# Patient Record
Sex: MALE | Race: WHITE | Employment: OTHER | URBAN - METROPOLITAN AREA
[De-identification: names, ages, dates, MRNs, and addresses within clinical notes are randomized per-mention and may not be internally consistent; named-entity substitution may affect disease eponyms.]

---

## 2020-05-11 ENCOUNTER — TRANSFERRED RECORDS (OUTPATIENT)
Dept: HEALTH INFORMATION MANAGEMENT | Facility: CLINIC | Age: 60
End: 2020-05-11

## 2020-05-17 ENCOUNTER — HOSPITAL ENCOUNTER (INPATIENT)
Facility: CLINIC | Age: 60
LOS: 5 days | Discharge: HOME | End: 2020-05-22
Attending: EMERGENCY MEDICINE | Admitting: PSYCHIATRY & NEUROLOGY
Payer: COMMERCIAL

## 2020-05-17 ENCOUNTER — TELEPHONE (OUTPATIENT)
Dept: BEHAVIORAL HEALTH | Facility: CLINIC | Age: 60
End: 2020-05-17

## 2020-05-17 DIAGNOSIS — R45.851 SUICIDAL IDEATION: ICD-10-CM

## 2020-05-17 DIAGNOSIS — F41.9 ANXIETY: ICD-10-CM

## 2020-05-17 DIAGNOSIS — G40.909 SEIZURE DISORDER (H): ICD-10-CM

## 2020-05-17 DIAGNOSIS — Z03.818 ENCNTR FOR OBS FOR SUSP EXPSR TO OTH BIOLG AGENTS RULED OUT: ICD-10-CM

## 2020-05-17 DIAGNOSIS — F41.1 GENERALIZED ANXIETY DISORDER: Primary | ICD-10-CM

## 2020-05-17 DIAGNOSIS — G47.09 OTHER INSOMNIA: ICD-10-CM

## 2020-05-17 DIAGNOSIS — E11.9 TYPE 2 DIABETES MELLITUS WITHOUT COMPLICATION, WITHOUT LONG-TERM CURRENT USE OF INSULIN (H): ICD-10-CM

## 2020-05-17 DIAGNOSIS — F33.9 EPISODE OF RECURRENT MAJOR DEPRESSIVE DISORDER, UNSPECIFIED DEPRESSION EPISODE SEVERITY (H): ICD-10-CM

## 2020-05-17 DIAGNOSIS — F32.2 SEVERE MAJOR DEPRESSION WITHOUT PSYCHOTIC FEATURES (H): ICD-10-CM

## 2020-05-17 PROBLEM — F33.2 SEVERE EPISODE OF RECURRENT MAJOR DEPRESSIVE DISORDER, WITHOUT PSYCHOTIC FEATURES (H): Status: ACTIVE | Noted: 2020-05-17

## 2020-05-17 LAB
AMPHETAMINES UR QL SCN: NEGATIVE
BARBITURATES UR QL: NEGATIVE
BENZODIAZ UR QL: NEGATIVE
CANNABINOIDS UR QL SCN: NEGATIVE
COCAINE UR QL: NEGATIVE
ETHANOL UR QL SCN: NEGATIVE
GLUCOSE BLDC GLUCOMTR-MCNC: 84 MG/DL (ref 70–99)
OPIATES UR QL SCN: NEGATIVE
SARS-COV-2 PCR COMMENT: NORMAL
SARS-COV-2 RNA SPEC QL NAA+PROBE: NEGATIVE
SARS-COV-2 RNA SPEC QL NAA+PROBE: NORMAL
SPECIMEN SOURCE: NORMAL
SPECIMEN SOURCE: NORMAL

## 2020-05-17 PROCEDURE — 99222 1ST HOSP IP/OBS MODERATE 55: CPT | Mod: 95 | Performed by: NURSE PRACTITIONER

## 2020-05-17 PROCEDURE — 99207 ZZC CDG-MDM COMPONENT: MEETS MODERATE - DOWN CODED: CPT | Performed by: NURSE PRACTITIONER

## 2020-05-17 PROCEDURE — 25000132 ZZH RX MED GY IP 250 OP 250 PS 637: Performed by: NURSE PRACTITIONER

## 2020-05-17 PROCEDURE — 99285 EMERGENCY DEPT VISIT HI MDM: CPT | Mod: 25 | Performed by: EMERGENCY MEDICINE

## 2020-05-17 PROCEDURE — 90791 PSYCH DIAGNOSTIC EVALUATION: CPT

## 2020-05-17 PROCEDURE — 80320 DRUG SCREEN QUANTALCOHOLS: CPT | Performed by: EMERGENCY MEDICINE

## 2020-05-17 PROCEDURE — 12400001 ZZH R&B MH UMMC

## 2020-05-17 PROCEDURE — 87635 SARS-COV-2 COVID-19 AMP PRB: CPT | Performed by: EMERGENCY MEDICINE

## 2020-05-17 PROCEDURE — 80307 DRUG TEST PRSMV CHEM ANLYZR: CPT | Performed by: EMERGENCY MEDICINE

## 2020-05-17 PROCEDURE — 99285 EMERGENCY DEPT VISIT HI MDM: CPT | Mod: Z6 | Performed by: EMERGENCY MEDICINE

## 2020-05-17 PROCEDURE — 00000146 ZZHCL STATISTIC GLUCOSE BY METER IP

## 2020-05-17 RX ORDER — BUPROPION HYDROCHLORIDE 300 MG/1
300 TABLET ORAL EVERY MORNING
Status: ON HOLD | COMMUNITY
End: 2020-05-21

## 2020-05-17 RX ORDER — GLIPIZIDE 10 MG/1
10 TABLET, FILM COATED, EXTENDED RELEASE ORAL
Status: DISCONTINUED | OUTPATIENT
Start: 2020-05-18 | End: 2020-05-22 | Stop reason: HOSPADM

## 2020-05-17 RX ORDER — BUPROPION HYDROCHLORIDE 300 MG/1
300 TABLET ORAL DAILY
Status: DISCONTINUED | OUTPATIENT
Start: 2020-05-18 | End: 2020-05-22 | Stop reason: HOSPADM

## 2020-05-17 RX ORDER — METFORMIN HCL 500 MG
TABLET, EXTENDED RELEASE 24 HR ORAL
Status: ON HOLD | COMMUNITY
End: 2020-05-21

## 2020-05-17 RX ORDER — ACETAMINOPHEN 325 MG/1
650 TABLET ORAL EVERY 4 HOURS PRN
Status: DISCONTINUED | OUTPATIENT
Start: 2020-05-17 | End: 2020-05-22 | Stop reason: HOSPADM

## 2020-05-17 RX ORDER — DEXTROSE MONOHYDRATE 25 G/50ML
25-50 INJECTION, SOLUTION INTRAVENOUS
Status: DISCONTINUED | OUTPATIENT
Start: 2020-05-17 | End: 2020-05-22 | Stop reason: HOSPADM

## 2020-05-17 RX ORDER — ATORVASTATIN CALCIUM 20 MG/1
20 TABLET, FILM COATED ORAL DAILY
COMMUNITY

## 2020-05-17 RX ORDER — TRAZODONE HYDROCHLORIDE 50 MG/1
50 TABLET, FILM COATED ORAL
Status: DISCONTINUED | OUTPATIENT
Start: 2020-05-17 | End: 2020-05-22 | Stop reason: HOSPADM

## 2020-05-17 RX ORDER — GLIPIZIDE 10 MG/1
10 TABLET, FILM COATED, EXTENDED RELEASE ORAL DAILY
COMMUNITY

## 2020-05-17 RX ORDER — CITALOPRAM HYDROBROMIDE 20 MG/1
20 TABLET ORAL DAILY
Status: DISCONTINUED | OUTPATIENT
Start: 2020-05-17 | End: 2020-05-22 | Stop reason: HOSPADM

## 2020-05-17 RX ORDER — NICOTINE POLACRILEX 4 MG
15-30 LOZENGE BUCCAL
Status: DISCONTINUED | OUTPATIENT
Start: 2020-05-17 | End: 2020-05-22 | Stop reason: HOSPADM

## 2020-05-17 RX ORDER — HYDROXYZINE HYDROCHLORIDE 25 MG/1
25 TABLET, FILM COATED ORAL EVERY 4 HOURS PRN
Status: DISCONTINUED | OUTPATIENT
Start: 2020-05-17 | End: 2020-05-20

## 2020-05-17 RX ORDER — METFORMIN HCL 500 MG
500 TABLET, EXTENDED RELEASE 24 HR ORAL
Status: DISCONTINUED | OUTPATIENT
Start: 2020-05-17 | End: 2020-05-22 | Stop reason: HOSPADM

## 2020-05-17 RX ORDER — ATORVASTATIN CALCIUM 20 MG/1
20 TABLET, FILM COATED ORAL EVERY EVENING
Status: DISCONTINUED | OUTPATIENT
Start: 2020-05-17 | End: 2020-05-22 | Stop reason: HOSPADM

## 2020-05-17 RX ORDER — ALUMINA, MAGNESIA, AND SIMETHICONE 2400; 2400; 240 MG/30ML; MG/30ML; MG/30ML
30 SUSPENSION ORAL EVERY 4 HOURS PRN
Status: DISCONTINUED | OUTPATIENT
Start: 2020-05-17 | End: 2020-05-22 | Stop reason: HOSPADM

## 2020-05-17 RX ADMIN — METFORMIN HYDROCHLORIDE 500 MG: 500 TABLET, EXTENDED RELEASE ORAL at 19:05

## 2020-05-17 RX ADMIN — ATORVASTATIN CALCIUM 20 MG: 20 TABLET, FILM COATED ORAL at 19:05

## 2020-05-17 ASSESSMENT — ACTIVITIES OF DAILY LIVING (ADL)
DRESS: 0-->INDEPENDENT
TRANSFERRING: 0-->INDEPENDENT
DRESS: INDEPENDENT
BATHING: 0-->INDEPENDENT
HYGIENE/GROOMING: INDEPENDENT
LAUNDRY: WITH SUPERVISION
NUMBER_OF_TIMES_PATIENT_HAS_FALLEN_WITHIN_LAST_SIX_MONTHS: 0
AMBULATION: 0-->INDEPENDENT
ORAL_HYGIENE: INDEPENDENT
RETIRED_COMMUNICATION: 0-->UNDERSTANDS/COMMUNICATES WITHOUT DIFFICULTY
HYGIENE/GROOMING: INDEPENDENT
RETIRED_EATING: 0-->INDEPENDENT
FALL_HISTORY_WITHIN_LAST_SIX_MONTHS: NO
DRESS: INDEPENDENT
SWALLOWING: 0-->SWALLOWS FOODS/LIQUIDS WITHOUT DIFFICULTY
ORAL_HYGIENE: INDEPENDENT
COGNITION: 0 - NO COGNITION ISSUES REPORTED
TOILETING: 0-->INDEPENDENT
LAUNDRY: WITH SUPERVISION

## 2020-05-17 ASSESSMENT — MIFFLIN-ST. JEOR: SCORE: 1615.48
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
## 2020-05-17 NOTE — PLAN OF CARE
"Patient arrived  5/17/2020  9:29 AM to the ED and transferred to unit 32N 5/17/2020 at 1250. Patient expression is Blunted/Flat. Patient appears anxious, flat and is cooperative. EPIC listed admitting  DX: Suicidal ideation [R45.851]  Anxiety [F41.9]  Episode of recurrent major depressive disorder, unspecified depression episode severity (H) [F33.9]    Patient describes \"poor sleep (trouble falling asleep and trouble staying asleep, poor appetite, hopeless, tired, anxious about COVID 19, poor concentration and depressed.\" Patient lives with wife and son and plans to return home. Patient GOAL is medication management and coping/therapy strategies to improve quality of life stating \"have always been depressed.\" Patient \"very tired\" at time of interview having been \"awake all night.\"    Vital signs reviewed related to admission.  /79   Pulse 83   Temp 98  F (36.7  C) (Oral)   Resp 16   Ht 1.778 m (5' 10\")   Wt 80.4 kg (177 lb 4.8 oz)   SpO2 96%   BMI 25.44 kg/m  . Patient denies  pain.     Patient rates depression 8/10* c/o Significant change in appetite or weight  Change in sleep (too much or too little)  Fatigue or loss of energy  Feelings of worthlessness or excessive/inappropriate guilt  Diminished concentration or indecisiveness  Patient rates anxiety at 8/10* c/o Uncontrolled worrying  Trouble relaxing  Restlessness           Patient denies current or recent suicidal ideation  Plan:   SIB denies     HI: Denies  Patient stated REASON for Admission : \"depression and anxiety (anxiety worse with COVID 19).\"                Patient stated GOALS for Discharge: \"Feel better/more energy.\"     PRECAUTIONS at admit:SUICIDE      15 min checks initiated. Brief orientation to unit provided.     Nursing will continue to monitor.    *Scale is 1-10 (10 is the worst)                "

## 2020-05-17 NOTE — ED PROVIDER NOTES
History     Chief Complaint   Patient presents with     Depression     Increasing depression and anxiety.     HPI  Angel Kent is a 60 year old male with a past medical history of depression, HLD, T2DM, and seizures who presents to the emergency department for mental health evaluation. Her reports increasing depression and anxiety.    The patient saw his PCP 2 days ago for this problem.  He reports that he has struggled with the symptoms for many years, but has had a significant increase in symptoms recently.   He has been admitted to the [facility] in the past for mental health and feels that this did help him. Patient has had suicidal ideation and feelings of worthlessness/hopelessness, but without specific plan.  Denies any homicidal ideation.  He discussed with his PCP 2 days ago that he felt he may need to be admitted again, but at that time, the decision was made for him to go home and discuss potential treatment options with his family with a plan to contact his PCP on Monday to discuss the plan going forward.  The patient denies any alcohol or drug use.  He recently restarted taking Wellbutrin 1.5-2 weeks ago. He used to take wellbutrin and celexa and felt that these did help some. He stopped taking his medications around January of this year because he was sick and tired of taking so many medications for his depression as well as for his diabetes. Since that time, he feels that his symptoms have worsened. He also attributes a worsening of his symptoms to increasing stress due to the Covid-19 pandemic situation.     The patient lives at home with his wife and son, who is about to graduate from high school. The pt feels guilty about not being able to be more supportive for his son and his inability to work due to his psychiatric symptoms. The pt usually works as a .     I have reviewed the Medications, Allergies, Past Medical and Surgical History, and Social History in the Epic  system.    Past Medical History:   Diagnosis Date     Depressive disorder      Seizures (H) 2010     Past Surgical History:   Procedure Laterality Date     ORTHOPEDIC SURGERY      knee     No current facility-administered medications for this encounter.      Current Outpatient Medications   Medication     CITALOPRAM HYDROBROMIDE PO     LamoTRIgine (LAMICTAL PO)     No Known Allergies  Past medical history, past surgical history, medications, and allergies were reviewed with the patient. Additional pertinent items: None    Social History     Socioeconomic History     Marital status:      Spouse name: Not on file     Number of children: Not on file     Years of education: Not on file     Highest education level: Not on file   Occupational History     Not on file   Social Needs     Financial resource strain: Not on file     Food insecurity     Worry: Not on file     Inability: Not on file     Transportation needs     Medical: Not on file     Non-medical: Not on file   Tobacco Use     Smoking status: Never Smoker   Substance and Sexual Activity     Alcohol use: No     Drug use: No     Sexual activity: Not on file   Lifestyle     Physical activity     Days per week: Not on file     Minutes per session: Not on file     Stress: Not on file   Relationships     Social connections     Talks on phone: Not on file     Gets together: Not on file     Attends Mormonism service: Not on file     Active member of club or organization: Not on file     Attends meetings of clubs or organizations: Not on file     Relationship status: Not on file     Intimate partner violence     Fear of current or ex partner: Not on file     Emotionally abused: Not on file     Physically abused: Not on file     Forced sexual activity: Not on file   Other Topics Concern     Not on file   Social History Narrative     Not on file     Social history was reviewed with the patient. Additional pertinent items: None    Review of Systems  General: No  fevers or chills  Skin: No rash or diaphoresis  Eyes: No eye redness or discharge  Ears/Nose/Throat: No rhinorrhea or nasal congestion  Respiratory: No cough or SOB  Cardiovascular: No chest pain or palpitations  Gastrointestinal: No nausea, vomiting, or diarrhea  Genitourinary: No urinary frequency, hematuria, or dysuria  Musculoskeletal: No arthralgias or myalgias  Neurologic: No numbness or weakness  Psychiatric: See HPI  Hematologic/Lymphatic/Immunologic: No leg swelling, no easy bruising/bleeding  Endocrine: No polyuria/polydypsia    A complete review of systems was performed with pertinent positives and negatives noted in the HPI, and all other systems negative.    Physical Exam   BP: (!) 142/99  Pulse: 72  Heart Rate: 72  Temp: 97.3  F (36.3  C)  Resp: 16  SpO2: 97 %      General: Well nourished, well developed, NAD  HEENT: EOMI, anicteric. NCAT, MMM  Neck: no jugular venous distension, supple, nl ROM  Cardiac: Regular rate, extremities appear well perfused  Pulm: NLB, airway patent  Skin: Warm and dry to the touch.  No rash  Extremities: No LE edema, no cyanosis, w/w/p  Neuro: A&Ox3, no gross focal deficits  Psych: depressed mood, passive SI without plan, anxiety    ED Course        Procedures                           Labs Ordered and Resulted from Time of ED Arrival Up to the Time of Departure from the ED   DRUG ABUSE SCREEN 6 CHEM DEP URINE ([facility])   COVID-19 VIRUS (CORONAVIRUS) BY PCR            No results found for this or any previous visit (from the past 24 hour(s)).    Labs, vital signs, and imaging studies were reviewed by me.    Medications - No data to display    Assessments & Plan (with Medical Decision Making)   Angel Kent is a 60 year old male who presents with increasing depression and anxiety.  He does report suicidal ideation, but does not currently have a plan.    1034 Pt was d/w [facility] , they recommend admission to mental health unit for further management of pt's psychiatric  symptoms.     I have reviewed the nursing notes.    I have reviewed the findings, diagnosis, plan and need for follow up with the patient.    Patient to be admitted to mental health unit. Plan was discussed with patient who understands and agrees with plan.     New Prescriptions    No medications on file       Final diagnoses:   Episode of recurrent major depressive disorder, unspecified depression episode severity (H)   Anxiety   Suicidal ideation       5/17/2020   [facility], [place], EMERGENCY DEPARTMENT     Petra Yun MD  05/17/20 1112
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
## 2020-05-17 NOTE — ED NOTES
ED to Behavioral Floor Handoff    SITUATION  Angel Kent is a 60 year old male who speaks English and lives in a home with family members The patient arrived in the ED by private car from home with a complaint of Depression (Increasing depression and anxiety.)  .The patient's current symptoms started/worsened 2 week(s) ago and during this time the symptoms have increased.   In the ED, pt was diagnosed with   Final diagnoses:   Episode of recurrent major depressive disorder, unspecified depression episode severity (H)   Anxiety   Suicidal ideation        Initial vitals were: BP: (!) 142/99  Pulse: 72  Heart Rate: 72  Temp: 97.3  F (36.3  C)  Resp: 16  SpO2: 97 %   --------  Is the patient diabetic? No   If yes, last blood glucose? --     If yes, was this treated in the ED? --  --------  Is the patient inebriated (ETOH) No or Impaired on other substances? No  MSSA done? N/A  Last MSSA score: --    Were withdrawal symptoms treated? N/A  Does the patient have a seizure history? Yes. If yes, date of most recent seizure - Pt states he has had one seizure in his life, 8 years ago. No precipitating factors and no seizure since then.  --------  Is the patient patient experiencing suicidal ideation? reports occasional suicidal thoughts representing feeling that life is not worth feeling    Homicidal ideation? denies current or recent homicidal ideation or behaviors.    Self-injurious behavior/urges? denies current or recent self injurious behavior or ideation.  ------  Was pt aggressive in the ED No  Was a code called No  Is the pt now cooperative? Yes  -------  Meds given in ED: Medications - No data to display   Family present during ED course? No  Family currently present? No    BACKGROUND  Does the patient have a cognitive impairment or developmental disability? No  Allergies: No Known Allergies.   Social demographics are   Social History     Socioeconomic History     Marital status:      Spouse name: None      Number of children: None     Years of education: None     Highest education level: None   Occupational History     None   Social Needs     Financial resource strain: None     Food insecurity     Worry: None     Inability: None     Transportation needs     Medical: None     Non-medical: None   Tobacco Use     Smoking status: Never Smoker   Substance and Sexual Activity     Alcohol use: No     Drug use: No     Sexual activity: None   Lifestyle     Physical activity     Days per week: None     Minutes per session: None     Stress: None   Relationships     Social connections     Talks on phone: None     Gets together: None     Attends Cheondoism service: None     Active member of club or organization: None     Attends meetings of clubs or organizations: None     Relationship status: None     Intimate partner violence     Fear of current or ex partner: None     Emotionally abused: None     Physically abused: None     Forced sexual activity: None   Other Topics Concern     None   Social History Narrative     None        ASSESSMENT  Labs results   Labs Ordered and Resulted from Time of ED Arrival Up to the Time of Departure from the ED   DRUG ABUSE SCREEN 6 CHEM DEP URINE ([facility])   COVID-19 VIRUS (CORONAVIRUS) BY PCR      Imaging Studies: No results found for this or any previous visit (from the past 24 hour(s)).   Most recent vital signs BP (!) 142/99   Pulse 72   Temp 97.3  F (36.3  C) (Oral)   Resp 16   SpO2 97%    Abnormal labs/tests/findings requiring intervention:---   Pain control: pt had none  Nausea control: pt had none    RECOMMENDATION  Are any infection precautions needed (MRSA, VRE, etc.)? No If yes, what infection? --  ---  Does the patient have mobility issues? independently. If yes, what device does the pt use? ---  ---  Is patient on 72 hour hold or commitment? No If on 72 hour hold, have hold and rights been given to patient? N/A  Are admitting orders written if after 10 p.m. ?N/A  Tasks needing to  be completed:---     Mara Cole RN     [phone] [facility]

## 2020-05-17 NOTE — TELEPHONE ENCOUNTER
S: Pt is a 60 yrs old male in the [place] ED for increased symptoms of mental health, reports by Brooklynn.    B: Pt has a long standing history of Depression and Anxiety. Pt presents to the ED for worsening Depression and Anxiety.  No drug or alcohol.  Pt reports that he stopped his medications back in January and since then has decompensated.  Pt has not been eating nor sleeping.  He feels ashame about going out in public, not being good enough, hopeless, guilt.  Pt has a weight loss of about 25-30 lbs; has poor eye contact.  Pt has passive SI, thinks people would do better w/o him, made statement that he doesn't want to go on.  Unable to work, feeling overwhelmed.  It has been 11 yrs since he had an episode like this. Family reports that Pt walks and talks slowly-which is not like him.   Pt has a hx of seizures.  His last one was in 2010.  Pt's last  in admission was in 2019 and was followed by a Partial Program. Pt has a dx of Diabetes.  Pt's labs were unremarkable.  He is medically cleared and ambulates independently.        Pt was swabbed for COVID19 test.  Results pending.     A: Vol.      R:  11:05AM- Olga accepted for herself on 32.  Unit and ED notified.           Patient cleared and ready for behavioral bed placement: Yes

## 2020-05-17 NOTE — PROGRESS NOTES
05/17/20 1416   Valuables   Patient Belongings remains with patient;locker;sent to security per site process  (glasses, shirt, pants, socks and cloth mask)   Patient Belongings Remaining with Patient other (see comments)  (clothing and cloth mask)   Patient Belongings Put in Hospital Secure Location (Security or Locker, etc.) other (see comments)  (see attached note for itemization)   Did you bring any home meds/supplements to the hospital?  No     Items sent to security: envelope [id number] containing 2 credit cards and 28.22 dollars cash.    Locker 2 items: Wallet with misc ID, coin purse, phone, sweatshirt, crocs , nail clipper, pen, plastic bag.    ..A               Admission:  I am responsible for any personal items that are not sent to the safe or pharmacy.  [facility] is not responsible for loss, theft or damage of any property in my possession.    Signature:  _________________________________ Date: _______  Time: _____                                              Staff Signature:  ____________________________ Date: ________  Time: _____      2nd Staff person, if patient is unable/unwilling to sign:    Signature: ________________________________ Date: ________  Time: _____     Discharge:  [facility] has returned all of my personal belongings:    Signature: _________________________________ Date: ________  Time: _____                                          Staff Signature:  ____________________________ Date: ________  Time: _____

## 2020-05-17 NOTE — PHARMACY-ADMISSION MEDICATION HISTORY
"Admission Medication History status for the 5/17/2020 admission is complete.  See EPIC admission navigator for Prior to Admission medications.    Medication history sources:  Patient, Care Everywhere ([facility]), Pharmacy dispense report (via outside meds tab)      Medication history source reliability: Good. Pt knew medication names (unsure of strengths, but verified with dispense report and Care Everywhere)    Medication adherence:  Good. Pt only recently restarted taking medications, but states he has not missed any doses in the last 2 weeks.    Changes made to PTA medication list (reason)  Added: Atorvastatin 20mg daily (per pt and Care Everywhere)  Glipizide XL 10mg daily (per pt and Care Everywhere)  Metformin XR 500mg tablets: titrating up to 2000mg daily (per pt and Care Everywhere)  Deleted: Citalopram PO- no sig or strength listed (per pt and dispense report has not taken in a long time)  Lamotrigine PO - no sig or strength listed (per pt and dispense report has not taken in a long time)  Changed: N/A    Additional medication history information (including reliability of information, actions taken by pharmacist):   - Pt interviewed on the unit via phone. He stated that he \"didn't know anything about his medications\" but then was able to name them all.   - Pt states that atorvastatin, metformin, and glipizide were recently prescribed to him and are \"waiting at [facility]\" because he has not picked them up yet.  - Pt reports starting bupropion around 2 weeks ago.  - Preferred pharmacy is [facility] [id number] in [place], [place].    Time spent in this activity: 20 minutes    Medication history completed by: JOSE Cho Pharmacy Intern     Prior to Admission medications    Medication Sig Last Dose Taking? Auth Provider   buPROPion (WELLBUTRIN XL) 300 MG 24 hr tablet Take 300 mg by mouth every morning 5/17/2020 at AM Yes Unknown, Entered By History   metFORMIN (GLUCOPHAGE-XR) 500 MG 24 hr tablet Take 1 tab " nightly for 1 week, 2 tabs nightly for 1 week, 3 tabs nightly for 1 week, then 4 tabs nightly thereafter. Not yet started  Unknown, Entered By History   atorvastatin (LIPITOR) 20 MG tablet Take 20 mg by mouth daily Not yet started  Unknown, Entered By History   glipiZIDE (GLUCOTROL XL) 10 MG 24 hr tablet Take 10 mg by mouth daily Not yet started  Unknown, Entered By History

## 2020-05-18 LAB
ALBUMIN SERPL-MCNC: 3.9 G/DL (ref 3.4–5)
ALP SERPL-CCNC: 70 U/L (ref 40–150)
ALT SERPL W P-5'-P-CCNC: 37 U/L (ref 0–70)
ANION GAP SERPL CALCULATED.3IONS-SCNC: 5 MMOL/L (ref 3–14)
AST SERPL W P-5'-P-CCNC: 18 U/L (ref 0–45)
BASOPHILS # BLD AUTO: 0 10E9/L (ref 0–0.2)
BASOPHILS NFR BLD AUTO: 0.2 %
BILIRUB SERPL-MCNC: 0.7 MG/DL (ref 0.2–1.3)
BUN SERPL-MCNC: 14 MG/DL (ref 7–30)
CALCIUM SERPL-MCNC: 9.1 MG/DL (ref 8.5–10.1)
CHLORIDE SERPL-SCNC: 106 MMOL/L (ref 94–109)
CO2 SERPL-SCNC: 27 MMOL/L (ref 20–32)
CREAT SERPL-MCNC: 0.88 MG/DL (ref 0.66–1.25)
DEPRECATED CALCIDIOL+CALCIFEROL SERPL-MC: 19 UG/L (ref 20–75)
DIFFERENTIAL METHOD BLD: NORMAL
EOSINOPHIL # BLD AUTO: 0 10E9/L (ref 0–0.7)
EOSINOPHIL NFR BLD AUTO: 0 %
ERYTHROCYTE [DISTWIDTH] IN BLOOD BY AUTOMATED COUNT: 12.3 % (ref 10–15)
GFR SERPL CREATININE-BSD FRML MDRD: >90 ML/MIN/{1.73_M2}
GLUCOSE BLDC GLUCOMTR-MCNC: 101 MG/DL (ref 70–99)
GLUCOSE BLDC GLUCOMTR-MCNC: 94 MG/DL (ref 70–99)
GLUCOSE SERPL-MCNC: 145 MG/DL (ref 70–99)
HBA1C MFR BLD: 7 % (ref 0–5.6)
HCT VFR BLD AUTO: 47.1 % (ref 40–53)
HGB BLD-MCNC: 16.5 G/DL (ref 13.3–17.7)
IMM GRANULOCYTES # BLD: 0 10E9/L (ref 0–0.4)
IMM GRANULOCYTES NFR BLD: 0.4 %
LYMPHOCYTES # BLD AUTO: 1 10E9/L (ref 0.8–5.3)
LYMPHOCYTES NFR BLD AUTO: 20.7 %
MCH RBC QN AUTO: 31.3 PG (ref 26.5–33)
MCHC RBC AUTO-ENTMCNC: 35 G/DL (ref 31.5–36.5)
MCV RBC AUTO: 89 FL (ref 78–100)
MONOCYTES # BLD AUTO: 0.4 10E9/L (ref 0–1.3)
MONOCYTES NFR BLD AUTO: 8.1 %
NEUTROPHILS # BLD AUTO: 3.2 10E9/L (ref 1.6–8.3)
NEUTROPHILS NFR BLD AUTO: 70.6 %
NRBC # BLD AUTO: 0 10*3/UL
NRBC BLD AUTO-RTO: 0 /100
PLATELET # BLD AUTO: 229 10E9/L (ref 150–450)
POTASSIUM SERPL-SCNC: 4 MMOL/L (ref 3.4–5.3)
PROT SERPL-MCNC: 6.8 G/DL (ref 6.8–8.8)
RBC # BLD AUTO: 5.27 10E12/L (ref 4.4–5.9)
SODIUM SERPL-SCNC: 138 MMOL/L (ref 133–144)
TSH SERPL DL<=0.005 MIU/L-ACNC: 0.63 MU/L (ref 0.4–4)
WBC # BLD AUTO: 4.6 10E9/L (ref 4–11)

## 2020-05-18 PROCEDURE — 25000132 ZZH RX MED GY IP 250 OP 250 PS 637: Performed by: NURSE PRACTITIONER

## 2020-05-18 PROCEDURE — 83036 HEMOGLOBIN GLYCOSYLATED A1C: CPT | Performed by: NURSE PRACTITIONER

## 2020-05-18 PROCEDURE — 36415 COLL VENOUS BLD VENIPUNCTURE: CPT | Performed by: NURSE PRACTITIONER

## 2020-05-18 PROCEDURE — 82306 VITAMIN D 25 HYDROXY: CPT | Performed by: NURSE PRACTITIONER

## 2020-05-18 PROCEDURE — G0177 OPPS/PHP; TRAIN & EDUC SERV: HCPCS

## 2020-05-18 PROCEDURE — 99233 SBSQ HOSP IP/OBS HIGH 50: CPT | Mod: 95 | Performed by: NURSE PRACTITIONER

## 2020-05-18 PROCEDURE — 80053 COMPREHEN METABOLIC PANEL: CPT | Performed by: NURSE PRACTITIONER

## 2020-05-18 PROCEDURE — 00000146 ZZHCL STATISTIC GLUCOSE BY METER IP

## 2020-05-18 PROCEDURE — 85025 COMPLETE CBC W/AUTO DIFF WBC: CPT | Performed by: NURSE PRACTITIONER

## 2020-05-18 PROCEDURE — 84443 ASSAY THYROID STIM HORMONE: CPT | Performed by: NURSE PRACTITIONER

## 2020-05-18 PROCEDURE — 12400001 ZZH R&B MH UMMC

## 2020-05-18 RX ORDER — LAMOTRIGINE 25 MG/1
25 TABLET ORAL DAILY
Status: DISCONTINUED | OUTPATIENT
Start: 2020-05-18 | End: 2020-05-22 | Stop reason: HOSPADM

## 2020-05-18 RX ADMIN — GLIPIZIDE 10 MG: 10 TABLET, FILM COATED, EXTENDED RELEASE ORAL at 09:06

## 2020-05-18 RX ADMIN — TRAZODONE HYDROCHLORIDE 50 MG: 50 TABLET ORAL at 21:50

## 2020-05-18 RX ADMIN — METFORMIN HYDROCHLORIDE 500 MG: 500 TABLET, EXTENDED RELEASE ORAL at 19:17

## 2020-05-18 RX ADMIN — LAMOTRIGINE 25 MG: 25 TABLET ORAL at 14:45

## 2020-05-18 RX ADMIN — BUPROPION HYDROCHLORIDE 300 MG: 300 TABLET, FILM COATED, EXTENDED RELEASE ORAL at 09:06

## 2020-05-18 RX ADMIN — CITALOPRAM HYDROBROMIDE 20 MG: 20 TABLET ORAL at 09:06

## 2020-05-18 RX ADMIN — ATORVASTATIN CALCIUM 20 MG: 20 TABLET, FILM COATED ORAL at 19:17

## 2020-05-18 ASSESSMENT — ACTIVITIES OF DAILY LIVING (ADL)
HYGIENE/GROOMING: INDEPENDENT
LAUNDRY: WITH SUPERVISION
ORAL_HYGIENE: INDEPENDENT
DRESS: SCRUBS (BEHAVIORAL HEALTH)
ORAL_HYGIENE: INDEPENDENT
LAUNDRY: WITH SUPERVISION
HYGIENE/GROOMING: INDEPENDENT
DRESS: SCRUBS (BEHAVIORAL HEALTH)

## 2020-05-18 NOTE — PROGRESS NOTES
"CLINICAL NUTRITION SERVICES - ASSESSMENT NOTE     Nutrition Prescription    RECOMMENDATIONS FOR MDs/PROVIDERS TO ORDER:  None today    Malnutrition Status:    Unable to determine due to no NFPE completed     Recommendations already ordered by Registered Dietitian (RD):  Diet education     Future/Additional Recommendations:  Monitor intakes  Monitor weight for trend  Snacks per pt request      REASON FOR ASSESSMENT  Shell Velasquez is a 60 year old male assessed by the dietitian for Admission Nutrition Risk Screen for reduced oral intake over the last month.    PMH significant for depression, HLD, T2DM, and seizures admitted for increased depression and anxiety.   NUTRITION HISTORY  Pt reports UBW ~193#, most recently 6 months ago. Appetite started to decline 2 months ago d/ anxiety/depression  and worsened in the past 2-3 weeks. Eating \"almost nothing\" during this time. Diet recall as follows (provided limited information):   Breakfast: cereal   Lunch: small meal   Dinner: not that much   Snacks: apple or banana       CURRENT NUTRITION ORDERS  Diet: Regular  Intake/Tolerance: Reports improving appetite since admit. Stated portions are too large, has been selecting 2 sides vs 4 allowed on menu. Discussed small/frequent meals during times of decreased appetite/intakes to meet nutritional needs. Declined snacks at this time, stated \"whats offered is fine\". Discussed snacks available on unit when requested.   LABS  Labs reviewed:  Results for SHELL VELASQUEZ (MRN [id number]) as of 5/18/2020 10:29   Ref. Range 5/18/2020 08:05   Sodium Latest Ref Range: 133 - 144 mmol/L 138   Potassium Latest Ref Range: 3.4 - 5.3 mmol/L 4.0   Chloride Latest Ref Range: 94 - 109 mmol/L 106   Carbon Dioxide Latest Ref Range: 20 - 32 mmol/L 27   Urea Nitrogen Latest Ref Range: 7 - 30 mg/dL 14   Creatinine Latest Ref Range: 0.66 - 1.25 mg/dL 0.88   GFR Estimate Latest Ref Range: >60 mL/min/1.73_m2 >90   GFR Estimate If Black Latest " "Ref Range: >60 mL/min/1.73_m2 >90   Calcium Latest Ref Range: 8.5 - 10.1 mg/dL 9.1   Anion Gap Latest Ref Range: 3 - 14 mmol/L 5   Albumin Latest Ref Range: 3.4 - 5.0 g/dL 3.9   Protein Total Latest Ref Range: 6.8 - 8.8 g/dL 6.8   Bilirubin Total Latest Ref Range: 0.2 - 1.3 mg/dL 0.7   Alkaline Phosphatase Latest Ref Range: 40 - 150 U/L 70   ALT Latest Ref Range: 0 - 70 U/L 37   AST Latest Ref Range: 0 - 45 U/L 18   Hemoglobin A1C Latest Ref Range: 0 - 5.6 % 7.0 (H)   TSH Latest Ref Range: 0.40 - 4.00 mU/L 0.63     MEDICATIONS  Medications reviewed:  glipizide  Metformin  PRN: maalox,  MOM    ANTHROPOMETRICS  Height: 177.8 cm (5' 10\")  Most Recent Weight: 80.4 kg (177 lb 4.8 oz)    IBW: 75.4 kg  BMI: 25.44 kg/m^2, Overweight BMI 25-29.9  Weight History: ZKX=500#. 17# (8.7%) wt loss in 9 months  Wt Readings from Last 10 Encounters:   05/17/20 80.4 kg (177 lb 4.8 oz)   08/01/19 88 kg (194 lb)   09/18/16 88.5 kg (195 lb)     Dosing Weight: 80.4 kg    ASSESSED NUTRITION NEEDS  Estimated Energy Needs: 0953-4499 kcals/day (20 - 25 kcals/kg)  Justification: Overweight  Estimated Protein Needs: 64-80 grams protein/day (0.8 - 1 grams of pro/kg)  Justification: Maintenance  Estimated Fluid Needs: 2380-0220 mL/day (1 mL/kcal)   Justification: Maintenance or Per provider pending fluid status    PHYSICAL FINDINGS  See malnutrition section below.     MALNUTRITION  % Intake: Unable to assess  % Weight Loss: Weight loss does not meet criteria  Subcutaneous Fat Loss: Unable to assess  Muscle Loss: Unable to assess  Fluid Accumulation/Edema: None noted  Malnutrition Diagnosis: Unable to determine due to no NFPE completed     NUTRITION DIAGNOSIS  Predicted inadequate oral intakes related to anxiety/depression as evidenced by pt report and wt loss of 17# (8.7%) wt loss in 9 months      INTERVENTIONS  Implementation  Nutrition Education: Discussed small/frequent meals during times of decreased appetite/intakes to meet nutritional needs "   Snacks pr pt request      Goals  Patient to consume % of nutritionally adequate meal trays TID, or the equivalent with supplements/snacks.     Monitoring/Evaluation  Progress toward goals will be monitored and evaluated per protocol.    Marguerite Gama MS, RD, LDN  Unit Pager [phone]
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
## 2020-05-18 NOTE — PROGRESS NOTES
Pt appears very depressed and feels remorseful and embarassed. His eye contact was poor. Pt continued to say  he wished he had not stopped taking his meds . He is concerned about his son, who is having difficulty w/ on line school. His son is due to graduate in a couple weeks. Pt feels guilty that he's not around to help out & is ruminating about feeling he's a failure. Pt attended most groups & participated to some degree in each. He is going go to to the psychotherapy group [place]. Pt hesitated when asked if he could be safe while here.  Pt appears in much distress but ultimately stated he would be safe.     05/18/20 1400   Behavioral Health   Hallucinations denies / not responding to hallucinations   Thinking distractable;poor concentration   Orientation person: oriented   Memory baseline memory   Insight admits / accepts;insight appropriate to situation   Judgement impaired   Eye Contact at examiner   Affect blunted, flat;sad;tense   Mood depressed;hopeless;anxious   Physical Appearance/Attire attire appropriate to age and situation   Hygiene well groomed   Suicidality other (see comments)  (hesitated but denies si)   1. Wish to be Dead (Recent) No   2. Non-Specific Active Suicidal Thoughts (Recent) No   Self Injury other (see comment)  (none)   Activity withdrawn;other (see comment)  (attends all groups)   Speech clear;coherent   Psycho Education   Type of Intervention 1:1 intervention   Response participates with encouragement   Hours 0.5   Treatment Detail check in   Activities of Daily Living   Hygiene/Grooming independent   Oral Hygiene independent   Dress scrubs (behavioral health)   Laundry with supervision   Room Organization independent

## 2020-05-18 NOTE — PROGRESS NOTES
"   05/17/20 2000   Group Therapy Session   Group Attendance attended group session   Total Time (minutes) 50   Group Type psychotherapeutic   Patient Participation/Contribution cooperative with task;discussed personal experience with topic;listened actively   Psychotherapy group goals: Identify and share responses to 4 questions (fears, loves/likes, things to let go, wants).  Facundo reports feeling a decrease in anxiety since this morning when he arrived. He states, \"I feel more calm now.\" He presents as pleasant, reflective. He describes feeling overwhelmed by many issues in his life currently. Affect sad. He participated in the activity and processed with the group. . He participated in a meditation activity near the end of group.   "

## 2020-05-18 NOTE — PROGRESS NOTES
"   05/17/20 2200   Behavioral Health   Hallucinations denies / not responding to hallucinations   Thinking poor concentration   Orientation person: oriented;place: oriented;date: oriented;time: oriented   Memory baseline memory   Insight insight appropriate to situation   Judgement intact   Eye Contact at examiner   Affect blunted, flat   Mood depressed;hopeless;anxious   Physical Appearance/Attire attire appropriate to age and situation   Hygiene well groomed   Suicidality other (see comments)  (Patient denies)   1. Wish to be Dead (Recent) No   2. Non-Specific Active Suicidal Thoughts (Recent) No   Self Injury other (see comment)  (Patient denies)   Elopement   (None observed )   Activity other (see comment)  (Patient present and social in the milieu)   Speech clear;coherent   Psychomotor / Gait balanced;steady   Safety   Suicidality Status 15   Activities of Daily Living   Hygiene/Grooming independent   Oral Hygiene independent   Dress independent   Laundry with supervision   Room Organization independent   Patient ate dinner, snacks, and was social in the milieu. Patient reported depression and anxiety. Patient reported his depression is at \"7\" out of ten with ten being the highest. Patient reported his anxiety is at \"5 or 6\" out of ten with ten being the highest. Patient reported \"I'm anxious because I'm here\". Patient reported I'm not jittery anymore\". Patient reported my appetite \"is reduced I lost 15 or 16 pounds\". Pt reported \"no\" pain. Patient reported \"sleep is a big issues over several weeks\".   " Left message on machine. Advised patients son to please call back to discuss Dr Matute recommendations further.     Awaiting call back.

## 2020-05-18 NOTE — PROGRESS NOTES
Attended 2 OT groups. Initiated groups with jaquelin affect and mood. Noted racing thoughts, anxiety and lack of sleep have interfered with daily living activities/roles. Goal for admission is for resolve of sx's for improved self management. Attentive and added to discussion with support, structure and direct questions.  Expressed motivation for change and learning new life management skills. Pt was given and completed a written self assessment. Identified RFA as not taking meds x 2 months. Further noted experiencing: sadness, anxiety, helplessness, mood swings, depression, guilt, negative racing thoughts, cognitive deficits, slowed thinking, obsessive thoughts, withdrawal from others, relationship problems, restlessness, trouble starting and completing tasks, trouble finding supports and procrastination. Identified talking to someone and walking as his coping skills. Goals selected include: look at self destructive behaviors, find resources/supports, improve focus and manage anxiety. Current supports identified as: wife, son, and MD at [facility] [address]. OT purpose was explained with a value of having involvement in tx plan, and provided options to meet self identified goals. Will assess further in the areas of organization, problem solving, and concentration. Easily distracted resulting in poor concentration, focus and poor follow through when involved in selected simple cognitive task. Will encourage and support consistent group attendance to increase self awareness and development of healthy coping skills and routine.

## 2020-05-18 NOTE — PLAN OF CARE
BEHAVIORAL TEAM DISCUSSION    Participants: Olga Shannon CNP; Odilia George and Linda No CTC's; Brandee Mccarty RN  Progress: new admission to [facility] station 32 for this 60 year old male admitted due to depression and SI, no specific plan.  He was recently started on Wellbutrin  Anticipated length of stay: TBD  Continued Stay Criteria/Rationale: pt needs further assessment   Medical/Physical: hx of seizure  Precautions:   Behavioral Orders   Procedures     Code 1 - Restrict to Unit     Routine Programming     As clinically indicated     Seizure precautions     Status 15     Every 15 minutes.     Suicide precautions     Patients on Suicide Precautions should have a Combination Diet ordered that includes a Diet selection(s) AND a Behavioral Tray selection for Safe Tray - with utensils, or Safe Tray - NO utensils       Plan: assess, monitor and stabilize.  CTC to arrange clinically appropriate discharge plan  Rationale for change in precautions or plan: new admission

## 2020-05-19 LAB — GLUCOSE BLDC GLUCOMTR-MCNC: 95 MG/DL (ref 70–99)

## 2020-05-19 PROCEDURE — 12400001 ZZH R&B MH UMMC

## 2020-05-19 PROCEDURE — 25000132 ZZH RX MED GY IP 250 OP 250 PS 637: Performed by: NURSE PRACTITIONER

## 2020-05-19 PROCEDURE — G0177 OPPS/PHP; TRAIN & EDUC SERV: HCPCS

## 2020-05-19 PROCEDURE — 99232 SBSQ HOSP IP/OBS MODERATE 35: CPT | Mod: 95 | Performed by: NURSE PRACTITIONER

## 2020-05-19 PROCEDURE — 00000146 ZZHCL STATISTIC GLUCOSE BY METER IP

## 2020-05-19 RX ADMIN — METFORMIN HYDROCHLORIDE 500 MG: 500 TABLET, EXTENDED RELEASE ORAL at 17:58

## 2020-05-19 RX ADMIN — BUPROPION HYDROCHLORIDE 300 MG: 300 TABLET, FILM COATED, EXTENDED RELEASE ORAL at 08:08

## 2020-05-19 RX ADMIN — GLIPIZIDE 10 MG: 10 TABLET, FILM COATED, EXTENDED RELEASE ORAL at 08:08

## 2020-05-19 RX ADMIN — LAMOTRIGINE 25 MG: 25 TABLET ORAL at 08:07

## 2020-05-19 RX ADMIN — ATORVASTATIN CALCIUM 20 MG: 20 TABLET, FILM COATED ORAL at 19:59

## 2020-05-19 RX ADMIN — CITALOPRAM HYDROBROMIDE 20 MG: 20 TABLET ORAL at 08:07

## 2020-05-19 RX ADMIN — HYDROXYZINE HYDROCHLORIDE 25 MG: 25 TABLET, FILM COATED ORAL at 08:50

## 2020-05-19 ASSESSMENT — ACTIVITIES OF DAILY LIVING (ADL)
ORAL_HYGIENE: INDEPENDENT
HYGIENE/GROOMING: INDEPENDENT
DRESS: INDEPENDENT;SCRUBS (BEHAVIORAL HEALTH)
LAUNDRY: UNABLE TO COMPLETE

## 2020-05-19 ASSESSMENT — MIFFLIN-ST. JEOR: SCORE: 1604.59

## 2020-05-19 NOTE — PROGRESS NOTES
Work Completed: CTC reviewed chart and attended team discussion.    CTC scheduled follow up PCP appointment and new therapy intake appointment.  Completed AVS    Discharge plan or goal: plan to discharge early next week                Barriers to discharge: none

## 2020-05-19 NOTE — PROGRESS NOTES
Pt had a calm shift. Pt said she felt a little anxious because he was having a hard time tracking past events in groups. Pt had a good shift. Pt was pleasant in the milieu.        05/19/20 1244   Behavioral Health   Hallucinations denies / not responding to hallucinations   Thinking distractable   Orientation person: oriented;place: oriented;date: oriented   Memory baseline memory   Insight insight appropriate to situation   Judgement intact   Eye Contact at examiner   Affect full range affect   Mood mood is calm;anxious   Physical Appearance/Attire attire appropriate to age and situation   Hygiene well groomed   Suicidality other (see comments)  (pt denies)   1. Wish to be Dead (Recent) No   2. Non-Specific Active Suicidal Thoughts (Recent) No   Self Injury other (see comment)  (pt denies)   Activity other (see comment)  (active in milieu)   Speech clear;coherent   Medication Sensitivity no stated side effects;no observed side effects   Psychomotor / Gait balanced;steady   Activities of Daily Living   Hygiene/Grooming independent   Oral Hygiene independent   Dress independent;scrubs (behavioral health)   Laundry unable to complete   Room Organization independent

## 2020-05-19 NOTE — PROGRESS NOTES
Work Completed:CTC met with pt, discussed progress, events that led to inpatient admit, stressors. 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  Pt was given supervised phone access for messages.     Discharge plan or goal: pt to continue stabilizing                Barriers to discharge: stabilization

## 2020-05-19 NOTE — PROGRESS NOTES
Attended 3 OT groups. Initiated and actively participated. Chose to work on simple cognitive activity and was able to focus x 45 minutes with good results. Noted he was unable to focus even for short periods of time yesterday. Also noted his recall is extremely poor and interferes when he is trying to explain things.  Verbalized awareness that his med non-compliance x 2 months is in direct correlation to his decline in functional performance. Pleasant and social with peers.

## 2020-05-19 NOTE — PROGRESS NOTES
"[facility]   Psychiatric Progress Note      Impression:     Angel \"Facundo\" Yoli is a 60-year-old male admitted to [facility] on 5/17/2020.  He was admitted as a voluntary patient through the ER due to depressive symptoms and suicidal ideation without a specific plan.  He has not been working due to his symptoms and because of COVID-19.  He worries about not being supportive enough for his son who is about to graduate high school and \"has some ground to make up\" before he does, in spite of being very intelligent.  He says his house is \"a disaster which makes me uncomfortable and anxious.\"  Yesterday he tried to turn on the sprinkler system and some of the sprinkler heads blew.  He is also worried about cracks in his garage floor, and there is a joint  between the garage apron and garage floor.  He is worried about water damage.  He stopped taking all medications in January.  His neurologist initiated Wellbutrin 2 weeks prior to admission and reportedly felt comfortable with him taking Wellbutrin in spite of it lowering the seizure threshold; he has a history of 1 seizure 8-10 years ago.  Since admission, Wellbutrin XL was continued.  Celexa was initiated.  A Lamictal titration was initiated.  PRNs of Hydroxyzine and Trazodone are available.   Mood is improving.  He denies suicidal ideation.  Anxiety remains problematic.          Diagnoses:     Major depressive disorder, severe, recurrent, without psychosis  Generalized anxiety disorder  Type 2 diabetes  Hyperlipidemia  History of 1 seizure         Plan:     Medications:  Continue Wellbutrin  mg daily.  Continue Celexa 20 mg daily.  Continue Lamictal titration.  Continue PRNs of Hydroxyzine and Trazodone.     Discharge to home when stable.  He has difficulty with technology both due to his skill level and anxiety surrounding it, which precludes participation in any day treatment or partial " hospitalization.  He would feel comfortable with individual therapy weekly via video.  He has a PCP for med management.        Telemedicine Visit: The patient's condition can be safely assessed and treated via synchronous audio and visual telemedicine encounter.       Start Time: 0809  Stop Time: 0827     Reason for Telemedicine Visit: COVID-19 precautions     Originating Site (Patient Location): [facility]     Distant Site (Provider Location): Provider Remote Setting     Consent:  The patient/guardian has verbally consented to: the potential risks and benefits of telemedicine (video visit) versus in person care; bill my insurance or make self-payment for services provided; and responsibility for payment of non-covered services.      Mode of Communication:  Video Conference via Skype     As the provider I attest to compliance with applicable laws and regulations related to telemedicine.      Attestation:  Patient has been seen and evaluated by me, STANISLAV Jeronimo CNP  The patient was counseled on nature of illness and treatment plan/options  Care was coordinated with treatment team        Clinical Global Impressions  First:  Considering your total clinical experience with this particular patient population, how severe are the patient's symptoms at this time?: 7 (05/17/20 1304)  Compared to the patient's condition at the START of treatment, this patient's condition is: 4 (05/17/20 1304)  Most recent:  Considering your total clinical experience with this particular patient population, how severe are the patient's symptoms at this time?: 7 (05/17/20 1304)  Compared to the patient's condition at the START of treatment, this patient's condition is: 4 (05/17/20 1304)          Interim History:     The patient's care was discussed with the treatment team and chart notes were reviewed.  Pt was documented as sleeping 7.5 hours during the overnight shift.  He took PRN Trazodone x 1 last evening  "and sleep was improved.  He has been attending groups.  States that concentration is improved.  Appetite is fair and improved.  Energy and motivation are somewhat improved.  He denies suicidal ideation.  \"I'm trying to be hopeful.\"  Anxiety remains problematic.  He plans to try PRN Hydroxyzine today.  States he talked to his wife and son last night which was beneficial.  Discussed conversations I had with his wife as well as his neurologist.  Pt states that he would feel comfortable with individual therapy via video until the COVID-19 pandemic has subsided.            Medications:     Current Facility-Administered Medications   Medication     acetaminophen (TYLENOL) tablet 650 mg     alum & mag hydroxide-simethicone (MAALOX  ES) suspension 30 mL     atorvastatin (LIPITOR) tablet 20 mg     buPROPion (WELLBUTRIN XL) 24 hr tablet 300 mg     citalopram (celeXA) tablet 20 mg     glucose gel 15-30 g    Or     dextrose 50 % injection 25-50 mL    Or     glucagon injection 1 mg     glipiZIDE (GLUCOTROL XL) 24 hr tablet 10 mg     hydrOXYzine (ATARAX) tablet 25 mg     lamoTRIgine (LaMICtal) tablet 25 mg     magnesium hydroxide (MILK OF MAGNESIA) suspension 30 mL     metFORMIN (GLUCOPHAGE-XR) 24 hr tablet 500 mg     traZODone (DESYREL) tablet 50 mg             Allergies:   No Known Allergies         Psychiatric Examination:     /87   Pulse 84   Temp 97.8  F (36.6  C) (Oral)   Resp 16   Ht 1.778 m (5' 10\")   Wt 79.3 kg (174 lb 14.4 oz)   SpO2 96%   BMI 25.10 kg/m      Appearance:  awake, alert and adequately groomed  Attitude:  cooperative  Eye Contact:  good  Mood:  anxious and depressed, some improvement  Affect:  appropriate and in normal range  Speech:  clear, coherent  Psychomotor Behavior:  no evidence of tardive dyskinesia, dystonia, or tics  Thought Process:  linear and goal oriented  Associations:  no loose associations  Thought Content:  no evidence of psychotic thought, denies homicidal ideation, denies " suicidal ideation  Insight:  fair  Judgment:  fair  Oriented to:  date, time, person, and place  Attention Span and Concentration:  some impairment  Recent and Remote Memory:  reports impairment in short term and long term memory  Language:  intact  Fund of Knowledge:  appropriate  Muscle Strength and Tone:  normal  Gait and Station:  normal          Labs:     Recent Results (from the past 24 hour(s))   Glucose by meter    Collection Time: 05/18/20 12:13 PM   Result Value Ref Range    Glucose 101 (H) 70 - 99 mg/dL   Glucose by meter    Collection Time: 05/18/20  5:55 PM   Result Value Ref Range    Glucose 94 70 - 99 mg/dL

## 2020-05-19 NOTE — PROGRESS NOTES
"   05/18/20 2000   Group Therapy Session   Group Attendance attended group session   Total Time (minutes) 50   Group Type psychotherapeutic   Patient Participation/Contribution cooperative with task;discussed personal experience with topic;listened actively   Patient participated in psychotherapy group which included a mindfulness activity focusing on the 5 senses and then processing as a group.    Facundo describes feeling anxious. He presents same with congruent affect. He engaged in the activity and processed with the group. He expressed having difficulty identifying his favorite sights, sounds, etc. And especially struggled to name how those things make him feel. He continued to appear frustrated even with group supportive words. After group he apologized to this writer for \"not being a good participant in group.\" Writer attempted to support and reassure him. Thanked him for attending.     Facundo may benefit from individual therapy while inpatient. Thoughts from the team?  "

## 2020-05-20 LAB
GLUCOSE BLDC GLUCOMTR-MCNC: 118 MG/DL (ref 70–99)
GLUCOSE BLDC GLUCOMTR-MCNC: 145 MG/DL (ref 70–99)

## 2020-05-20 PROCEDURE — 25000132 ZZH RX MED GY IP 250 OP 250 PS 637: Performed by: NURSE PRACTITIONER

## 2020-05-20 PROCEDURE — 99232 SBSQ HOSP IP/OBS MODERATE 35: CPT | Mod: 95 | Performed by: NURSE PRACTITIONER

## 2020-05-20 PROCEDURE — 90853 GROUP PSYCHOTHERAPY: CPT

## 2020-05-20 PROCEDURE — 12400001 ZZH R&B MH UMMC

## 2020-05-20 PROCEDURE — G0177 OPPS/PHP; TRAIN & EDUC SERV: HCPCS

## 2020-05-20 PROCEDURE — 00000146 ZZHCL STATISTIC GLUCOSE BY METER IP

## 2020-05-20 RX ORDER — GABAPENTIN 100 MG/1
200 CAPSULE ORAL EVERY 4 HOURS PRN
Status: DISCONTINUED | OUTPATIENT
Start: 2020-05-20 | End: 2020-05-22 | Stop reason: HOSPADM

## 2020-05-20 RX ADMIN — GABAPENTIN 200 MG: 100 CAPSULE ORAL at 15:12

## 2020-05-20 RX ADMIN — METFORMIN HYDROCHLORIDE 500 MG: 500 TABLET, EXTENDED RELEASE ORAL at 17:15

## 2020-05-20 RX ADMIN — ATORVASTATIN CALCIUM 20 MG: 20 TABLET, FILM COATED ORAL at 22:06

## 2020-05-20 RX ADMIN — LAMOTRIGINE 25 MG: 25 TABLET ORAL at 08:38

## 2020-05-20 RX ADMIN — BUPROPION HYDROCHLORIDE 300 MG: 300 TABLET, FILM COATED, EXTENDED RELEASE ORAL at 08:37

## 2020-05-20 RX ADMIN — CITALOPRAM HYDROBROMIDE 20 MG: 20 TABLET ORAL at 08:37

## 2020-05-20 RX ADMIN — TRAZODONE HYDROCHLORIDE 50 MG: 50 TABLET ORAL at 22:06

## 2020-05-20 RX ADMIN — GLIPIZIDE 10 MG: 10 TABLET, FILM COATED, EXTENDED RELEASE ORAL at 08:37

## 2020-05-20 ASSESSMENT — ACTIVITIES OF DAILY LIVING (ADL)
HYGIENE/GROOMING: HANDWASHING;INDEPENDENT
DRESS: SCRUBS (BEHAVIORAL HEALTH);INDEPENDENT
ORAL_HYGIENE: INDEPENDENT
DRESS: INDEPENDENT
LAUNDRY: WITH SUPERVISION
ORAL_HYGIENE: INDEPENDENT
HYGIENE/GROOMING: INDEPENDENT

## 2020-05-20 NOTE — PROGRESS NOTES
Pt awake entire shift.  Pt ate meals, attended groups, and is social upon approach.  Pt reports to doing better overall.  Still some problems with concentration.  Pt reports still having anxiety.   No si or sib.  Pt is cooperative and pleasant.       05/20/20 1411   Sleep/Rest/Relaxation   Day/Evening Time Hours up all shift   Behavioral Health   Hallucinations denies / not responding to hallucinations   Thinking poor concentration   Orientation person: oriented;place: oriented;date: oriented;time: oriented   Memory baseline memory   Insight admits / accepts   Judgement intact   Eye Contact at examiner   Affect full range affect   Mood depressed;anxious   Physical Appearance/Attire attire appropriate to age and situation   Hygiene well groomed   Suicidality other (see comments)  (denies)   1. Wish to be Dead (Recent) No   2. Non-Specific Active Suicidal Thoughts (Recent) No   Activity other (see comment)  (out in milieu; going to groups)   Speech clear;coherent   Psychomotor / Gait balanced;steady   Coping/Psychosocial   Verbalized Emotional State acceptance;anxiety;depression;hopefulness;relief   Plan of Care Reviewed With patient   Patient Agreement with Plan of Care agrees   Psycho Education   Type of Intervention 1:1 intervention   Response participates, initiates socially appropriate   Group Therapy Session   Group Attendance attended group session   Activities of Daily Living   Hygiene/Grooming handwashing;independent   Oral Hygiene independent   Dress scrubs (behavioral health);independent   Activity   Activity Assistance Provided independent

## 2020-05-20 NOTE — PLAN OF CARE
"Pt. was pleasant calm and visible in milieu . Pt. was quiet and minimally social with peers.Pt ate dinner and spent most of the eveing watching TV.  Pt. states that he is a little better than when he came. Pt. states that his \"thoughts are not clear, there is no clarity\". Pt denies hallucinations. Pt endorses depression and anxiety but denies SI or SIB \"I'll be safe\". Pt reports no concerns with appetite or sleep. Pt was compliant with BG checks and evening scheduled medications. No behavioral concerns. Nursing will continue to monitor.    "

## 2020-05-20 NOTE — PROGRESS NOTES
"[facility]   Psychiatric Progress Note      Impression:     Angel \"Facundo\" Yoli is a 60-year-old male admitted to [facility] on 5/17/2020.  He was admitted as a voluntary patient through the ER due to depressive symptoms and suicidal ideation without a specific plan.  He has not been working due to his symptoms and because of COVID-19.  He worries about not being supportive enough for his son who is about to graduate high school and \"has some ground to make up\" before he does, in spite of being very intelligent.  He says his house is \"a disaster which makes me uncomfortable and anxious.\"  Yesterday he tried to turn on the sprinkler system and some of the sprinkler heads blew.  He is also worried about cracks in his garage floor, and there is a joint  between the garage apron and garage floor.  He is worried about water damage.  He stopped taking all medications in January.  His neurologist initiated Wellbutrin 2 weeks prior to admission and reportedly felt comfortable with him taking Wellbutrin in spite of it lowering the seizure threshold; he has a history of 1 seizure 8-10 years ago.  Since admission, Wellbutrin XL was continued.  Celexa was initiated.  A Lamictal titration was initiated.  PRNs of Hydroxyzine was initiated and was ineffective for anxiety and replaced with PRN Neurontin.  PRN Trazodone was initiated.   Mood is improving.  He denies suicidal ideation.  Anxiety remains problematic.          Diagnoses:     Major depressive disorder, severe, recurrent, without psychosis  Generalized anxiety disorder  Type 2 diabetes  Hyperlipidemia  History of 1 seizure         Plan:     Medications:  Continue Wellbutrin  mg daily.  Continue Celexa 20 mg daily.  Continue Lamictal titration.  Continue PRN Trazodone.  Discontinue PRN Hydroxyzine and begin PRN Neurontin.     Discharge to home when stable.  He has difficulty with technology both " due to his skill level and anxiety surrounding it, which precludes participation in any day treatment or partial hospitalization.  He would feel comfortable with individual therapy weekly via video and has an appointment scheduled 5/26.  He has a PCP for med management.        Telemedicine Visit: The patient's condition can be safely assessed and treated via synchronous audio and visual telemedicine encounter.       Start Time: 0802  Stop Time: 0823     Reason for Telemedicine Visit: COVID-19 precautions     Originating Site (Patient Location): [facility]     Distant Site (Provider Location): Provider Remote Setting     Consent:  The patient/guardian has verbally consented to: the potential risks and benefits of telemedicine (video visit) versus in person care; bill my insurance or make self-payment for services provided; and responsibility for payment of non-covered services.      Mode of Communication:  Video Conference via Skype     As the provider I attest to compliance with applicable laws and regulations related to telemedicine.      Attestation:  Patient has been seen and evaluated by me, Brigitte Shannon, STANISLAV CNP  The patient was counseled on nature of illness and treatment plan/options  Care was coordinated with treatment team        Clinical Global Impressions  First:  Considering your total clinical experience with this particular patient population, how severe are the patient's symptoms at this time?: 7 (05/17/20 1304)  Compared to the patient's condition at the START of treatment, this patient's condition is: 4 (05/17/20 1304)  Most recent:  Considering your total clinical experience with this particular patient population, how severe are the patient's symptoms at this time?: 7 (05/17/20 1304)  Compared to the patient's condition at the START of treatment, this patient's condition is: 4 (05/17/20 1304)          Interim History:     The patient's care was discussed with the  "treatment team and chart notes were reviewed.  Pt was documented as sleeping 7.5 hours during the overnight shift and states he did not sleep as well since he didn't take PRN Trazodone last night.  He plans to ask for it tonight.  He took PRN Hydroxyzine yesterday, felt tired, experienced a headache, and had not relief from his anxiety.  Will try PRN Neurontin instead.  He has been attending all groups.  He has been in contact with his wife and son.  Discussed cognitive distortions, particularly his tendency to catastrophize.  Pt states that he feels more hopeful.  He denies suicidal ideation.  Concentration remains somewhat impaired.  Anxiety remains problematic.            Medications:     Current Facility-Administered Medications   Medication     acetaminophen (TYLENOL) tablet 650 mg     alum & mag hydroxide-simethicone (MAALOX  ES) suspension 30 mL     atorvastatin (LIPITOR) tablet 20 mg     buPROPion (WELLBUTRIN XL) 24 hr tablet 300 mg     citalopram (celeXA) tablet 20 mg     glucose gel 15-30 g    Or     dextrose 50 % injection 25-50 mL    Or     glucagon injection 1 mg     gabapentin (NEURONTIN) capsule 200 mg     glipiZIDE (GLUCOTROL XL) 24 hr tablet 10 mg     lamoTRIgine (LaMICtal) tablet 25 mg     magnesium hydroxide (MILK OF MAGNESIA) suspension 30 mL     metFORMIN (GLUCOPHAGE-XR) 24 hr tablet 500 mg     traZODone (DESYREL) tablet 50 mg             Allergies:   No Known Allergies         Psychiatric Examination:     /81 (BP Location: Left arm)   Pulse 88   Temp 98.2  F (36.8  C) (Oral)   Resp 16   Ht 1.778 m (5' 10\")   Wt 79.3 kg (174 lb 14.4 oz)   SpO2 96%   BMI 25.10 kg/m      Appearance:  awake, alert and adequately groomed  Attitude:  cooperative  Eye Contact:  good  Mood:  less depressed, anxious  Affect:  appropriate and in normal range  Speech:  clear, coherent  Psychomotor Behavior:  no evidence of tardive dyskinesia, dystonia, or tics  Thought Process:  linear and goal " oriented  Associations:  no loose associations  Thought Content:  no evidence of psychotic thought, denies homicidal ideation, denies suicidal ideation  Insight:  fair  Judgment:  intact  Oriented to:  date, time, person, and place  Attention Span and Concentration:  some impairment  Recent and Remote Memory:  reports impairment in short term and long term memory  Language:  intact  Fund of Knowledge:  appropriate  Muscle Strength and Tone:  normal  Gait and Station:  normal          Labs:     Recent Results (from the past 24 hour(s))   Glucose by meter    Collection Time: 05/19/20  5:56 PM   Result Value Ref Range    Glucose 95 70 - 99 mg/dL   Glucose by meter    Collection Time: 05/20/20  7:58 AM   Result Value Ref Range    Glucose 145 (H) 70 - 99 mg/dL

## 2020-05-20 NOTE — PROGRESS NOTES
Work Completed: CTC reviewed chart and remotely attended team discussion.      Discharge plan or goal: plan is in place.                Barriers to discharge: stabilization efforts in motion

## 2020-05-20 NOTE — PROGRESS NOTES
"Patient states has had \"shaking in hands\"  over the last several months, \"is that from the medications?\" Patient has fine tremors in both hands but is more pronounced in the left had, patient is left handed. Patient was encouraged to speak with Olga Shannon NP about it tomorrow.    "

## 2020-05-20 NOTE — PROGRESS NOTES
Attended 3 OT Groups. Initiated all groups and was focused on cognitive activities with good results. Feels his progress is slow but, admitted he has noticed slow positive changes in cognitive functioning. Pleasant and social when engaged in 1:1 conversation. Reports his wife is supportive and encouraging when he expresses fear/concern regarding projects and work.  Participated in relaxation experiential learning exercise. Expressed interest in resources and notes some benefit.

## 2020-05-21 LAB
GLUCOSE BLDC GLUCOMTR-MCNC: 122 MG/DL (ref 70–99)
GLUCOSE BLDC GLUCOMTR-MCNC: 160 MG/DL (ref 70–99)

## 2020-05-21 PROCEDURE — 99232 SBSQ HOSP IP/OBS MODERATE 35: CPT | Mod: 95 | Performed by: NURSE PRACTITIONER

## 2020-05-21 PROCEDURE — 12400001 ZZH R&B MH UMMC

## 2020-05-21 PROCEDURE — 25000132 ZZH RX MED GY IP 250 OP 250 PS 637: Performed by: NURSE PRACTITIONER

## 2020-05-21 PROCEDURE — 00000146 ZZHCL STATISTIC GLUCOSE BY METER IP

## 2020-05-21 PROCEDURE — G0177 OPPS/PHP; TRAIN & EDUC SERV: HCPCS

## 2020-05-21 PROCEDURE — 90853 GROUP PSYCHOTHERAPY: CPT

## 2020-05-21 RX ORDER — LAMOTRIGINE 25 MG/1
25 TABLET ORAL DAILY
Qty: 65 TABLET | Refills: 0 | Status: SHIPPED | OUTPATIENT
Start: 2020-05-22

## 2020-05-21 RX ORDER — TRAZODONE HYDROCHLORIDE 50 MG/1
50 TABLET, FILM COATED ORAL
Qty: 30 TABLET | Refills: 1 | Status: SHIPPED | OUTPATIENT
Start: 2020-05-21

## 2020-05-21 RX ORDER — BUPROPION HYDROCHLORIDE 300 MG/1
300 TABLET ORAL EVERY MORNING
Qty: 30 TABLET | Refills: 1 | Status: SHIPPED | OUTPATIENT
Start: 2020-05-21

## 2020-05-21 RX ORDER — CITALOPRAM HYDROBROMIDE 20 MG/1
20 TABLET ORAL DAILY
Qty: 30 TABLET | Refills: 1 | Status: SHIPPED | OUTPATIENT
Start: 2020-05-22

## 2020-05-21 RX ORDER — METFORMIN HCL 500 MG
TABLET, EXTENDED RELEASE 24 HR ORAL
Qty: 91 TABLET | Refills: 0 | Status: SHIPPED | OUTPATIENT
Start: 2020-05-21

## 2020-05-21 RX ADMIN — CITALOPRAM HYDROBROMIDE 20 MG: 20 TABLET ORAL at 08:02

## 2020-05-21 RX ADMIN — ATORVASTATIN CALCIUM 20 MG: 20 TABLET, FILM COATED ORAL at 21:34

## 2020-05-21 RX ADMIN — GLIPIZIDE 10 MG: 10 TABLET, FILM COATED, EXTENDED RELEASE ORAL at 08:02

## 2020-05-21 RX ADMIN — BUPROPION HYDROCHLORIDE 300 MG: 300 TABLET, FILM COATED, EXTENDED RELEASE ORAL at 08:02

## 2020-05-21 RX ADMIN — METFORMIN HYDROCHLORIDE 500 MG: 500 TABLET, EXTENDED RELEASE ORAL at 18:06

## 2020-05-21 RX ADMIN — TRAZODONE HYDROCHLORIDE 50 MG: 50 TABLET ORAL at 21:36

## 2020-05-21 RX ADMIN — LAMOTRIGINE 25 MG: 25 TABLET ORAL at 08:02

## 2020-05-21 ASSESSMENT — ACTIVITIES OF DAILY LIVING (ADL)
HYGIENE/GROOMING: INDEPENDENT
HYGIENE/GROOMING: INDEPENDENT
ORAL_HYGIENE: INDEPENDENT
DRESS: INDEPENDENT
LAUNDRY: WITH SUPERVISION
LAUNDRY: WITH SUPERVISION
ORAL_HYGIENE: INDEPENDENT
DRESS: INDEPENDENT

## 2020-05-21 ASSESSMENT — MIFFLIN-ST. JEOR: SCORE: 1605.04

## 2020-05-21 NOTE — PLAN OF CARE
Problem: OT General Care Plan  Goal: OT Goal 1    Pt attended 1 out of 3 OT groups offered. Pt actively participated in occupational therapy clinic. Pt was able to ask for assistance as needed, and independently initiated a familiar, goal-directed task. Pt demonstrated good attention to task. He mostly kept to himself during this group and appeared quietly engaged in his task, though was pleasant and polite on approach.

## 2020-05-21 NOTE — PROGRESS NOTES
Pt spent more time in lounge this shift, socializing with peers and friendly. Cooperative, no SI, SIB stated/observed. Pt has mentioned some concentration issues. Pt feels he has improved.     05/20/20 4195   Behavioral Health   Hallucinations denies / not responding to hallucinations   Thinking poor concentration   Orientation person: oriented;place: oriented   Memory baseline memory   Insight admits / accepts   Judgement intact   Eye Contact at examiner   Affect full range affect   Mood depressed;anxious   Physical Appearance/Attire attire appropriate to age and situation   Hygiene well groomed   Suicidality other (see comments)  (none stated/observed)   1. Wish to be Dead (Recent) No   2. Non-Specific Active Suicidal Thoughts (Recent) No   Self Injury other (see comment)  (none stated/observed)   Elopement   (N/A)   Activity other (see comment)  (visible/social)   Speech clear;coherent   Medication Sensitivity no stated side effects;no observed side effects   Psychomotor / Gait balanced;steady   Psycho Education   Type of Intervention 1:1 intervention   Response participates, initiates socially appropriate   Hours 0.5   Treatment Detail check in   Activities of Daily Living   Hygiene/Grooming independent   Oral Hygiene independent   Dress independent   Laundry with supervision   Room Organization independent

## 2020-05-21 NOTE — PROGRESS NOTES
"   05/20/20 2000   Group Therapy Session   Group Attendance attended group session   Total Time (minutes) 50   Group Type psychotherapeutic   Patient Participation/Contribution cooperative with task;discussed personal experience with topic;listened actively   Psychotherapy goal: Self-reflection through the use of the \"DBT House\" activity.    Facundo reports feeling \"pretty good! Improved.\"  He presents as pleasant with congruent affect. He reports feeling hopeful about possibly discharging on Friday.  Writer explained the DBT House activity to the group. Regarding some of the questions he appeared slightly anxious and stated, \"I just don't think that way.\"  Questions included: naming values (foundation), who or what supports you (roof), something of which you feel proud (billboard), what behaviors do you want to change or have better control (1st floor), what feelings do you want to experience more fully or in a more healthy way, things you keep hidden from others (door), etc.     He completed the activity and processed with the group. His responses were thoughtful and demonstrated insight.  Each evening writer asks each person what they look forward to in the next 24 hours. During past groups with writer, Facundo was unable to think of anything (writer gives examples such as: hot shower, good night's sleep, crossword puzzle, etc). Tonight he stated he looks forward to talking to providers about next steps toward discharge and perhaps day treatment. Affect was much brighter today.  "

## 2020-05-21 NOTE — PROGRESS NOTES
Work Completed: CTC reviewed chart and remotely attended team discussion.  Completed AVS    Discharge plan or goal: discharge to OP level of care tomorrow                Barriers to discharge: none

## 2020-05-21 NOTE — PLAN OF CARE
"RN/48    Patient reports \"with trazodone I slept very well last night and feel rested this morning.\" Patient reports some BUE tremors \"for a long time.\"    Patient is cooperative, med-compliant, eating 100% and attending groups. Patient is social/appropriate. NO behaviors.    Patient denies SI/SIB.    /73 (BP Location: Left arm)   Pulse 84   Temp 98.1  F (36.7  C) (Oral)   Resp 16   Ht 1.778 m (5' 10\")   Wt 79.4 kg (175 lb)   SpO2 96%   BMI 25.11 kg/m  . No c/o pain.    Nursing will continue to monitor.        "

## 2020-05-21 NOTE — PROGRESS NOTES
"[facility]   Psychiatric Progress Note      Impression:     Angel \"Facundo\" Yoli is a 60-year-old male admitted to [facility] on 5/17/2020.  He was admitted as a voluntary patient through the ER due to depressive symptoms and suicidal ideation without a specific plan.  He has not been working due to his symptoms and because of COVID-19.  He worries about not being supportive enough for his son who is about to graduate high school and \"has some ground to make up\" before he does, in spite of being very intelligent.  He says his house is \"a disaster which makes me uncomfortable and anxious.\"  Yesterday he tried to turn on the sprinkler system and some of the sprinkler heads blew.  He is also worried about cracks in his garage floor, and there is a joint  between the garage apron and garage floor.  He is worried about water damage.  He stopped taking all medications in January.  His neurologist initiated Wellbutrin 2 weeks prior to admission and reportedly felt comfortable with him taking Wellbutrin in spite of it lowering the seizure threshold; he has a history of 1 seizure 8-10 years ago.  Since admission, Wellbutrin XL was continued.  Celexa was initiated.  A Lamictal titration was initiated.  PRNs of Hydroxyzine was initiated and was ineffective for anxiety and replaced with PRN Neurontin.  PRN Trazodone was initiated.   Mood is improving.  He denies suicidal ideation.  Anxiety is reduced.         Diagnoses:     Major depressive disorder, severe, recurrent, without psychosis  Generalized anxiety disorder  Type 2 diabetes  Hyperlipidemia  History of 1 seizure         Plan:     Medications:  Continue Wellbutrin  mg daily.  Continue Celexa 20 mg daily.  Continue Lamictal titration.  Continue PRN Trazodone.  Discontinue PRN Hydroxyzine and begin PRN Neurontin.  Discharge meds sent to [facility].     Plan to discharge to home " tomorrow if progress continues.   He has a therapy intake 5/26.  He has a PCP for med management.        Telemedicine Visit: The patient's condition can be safely assessed and treated via synchronous audio and visual telemedicine encounter.       Start Time: 0842  Stop Time: 0855     Reason for Telemedicine Visit: COVID-19 precautions     Originating Site (Patient Location): [facility]     Distant Site (Provider Location): Provider Remote Setting     Consent:  The patient/guardian has verbally consented to: the potential risks and benefits of telemedicine (video visit) versus in person care; bill my insurance or make self-payment for services provided; and responsibility for payment of non-covered services.      Mode of Communication:  Video Conference via Skype     As the provider I attest to compliance with applicable laws and regulations related to telemedicine.      Attestation:  Patient has been seen and evaluated by me, STANISLAV Jeronimo CNP  The patient was counseled on nature of illness and treatment plan/options  Care was coordinated with treatment team        Clinical Global Impressions  First:  Considering your total clinical experience with this particular patient population, how severe are the patient's symptoms at this time?: 7 (05/17/20 1304)  Compared to the patient's condition at the START of treatment, this patient's condition is: 4 (05/17/20 1304)  Most recent:  Considering your total clinical experience with this particular patient population, how severe are the patient's symptoms at this time?: 7 (05/17/20 1304)  Compared to the patient's condition at the START of treatment, this patient's condition is: 4 (05/17/20 1304)          Interim History:     The patient's care was discussed with the treatment team and chart notes were reviewed.  Pt was documented as sleeping 7.25 hours during the overnight shift and reports sleeping well after taking PRN Trazodone.  He took  "PRN Neurontin x 1 yesterday but did not feel it was very effective.  He has been attending groups.  He feels \"more upbeat and optimistic.\"  Anxiety is reduced.  He denies suicidal ideation.  Appetite is improved.  Concentration is improving.  Tolerating meds well.  Discussed goal to discharge tomorrow.            Medications:     Current Facility-Administered Medications   Medication     acetaminophen (TYLENOL) tablet 650 mg     alum & mag hydroxide-simethicone (MAALOX  ES) suspension 30 mL     atorvastatin (LIPITOR) tablet 20 mg     buPROPion (WELLBUTRIN XL) 24 hr tablet 300 mg     citalopram (celeXA) tablet 20 mg     glucose gel 15-30 g    Or     dextrose 50 % injection 25-50 mL    Or     glucagon injection 1 mg     gabapentin (NEURONTIN) capsule 200 mg     glipiZIDE (GLUCOTROL XL) 24 hr tablet 10 mg     lamoTRIgine (LaMICtal) tablet 25 mg     magnesium hydroxide (MILK OF MAGNESIA) suspension 30 mL     metFORMIN (GLUCOPHAGE-XR) 24 hr tablet 500 mg     traZODone (DESYREL) tablet 50 mg             Allergies:   No Known Allergies         Psychiatric Examination:     /73 (BP Location: Left arm)   Pulse 84   Temp 98.1  F (36.7  C) (Oral)   Resp 16   Ht 1.778 m (5' 10\")   Wt 79.4 kg (175 lb)   SpO2 96%   BMI 25.11 kg/m      Appearance:  awake, alert and adequately groomed  Attitude:  cooperative  Eye Contact:  good  Mood:  \"pretty good, more upbeat and optimistic\" less anxious  Affect:  appropriate and in normal range, smiling  Speech:  clear, coherent  Psychomotor Behavior:  no evidence of tardive dyskinesia, dystonia, or tics  Thought Process:  linear and goal oriented  Associations:  no loose associations  Thought Content:  no evidence of psychotic thought, denies homicidal ideation, denies suicidal ideation  Insight:  fair  Judgment:  intact  Oriented to:  date, time, person, and place  Attention Span and Concentration:  some impairment but improved compared to admission  Recent and Remote Memory:  " reports impairment in short term and long term memory  Language:  intact  Fund of Knowledge:  appropriate  Muscle Strength and Tone:  normal  Gait and Station:  normal          Labs:     Recent Results (from the past 24 hour(s))   Glucose by meter    Collection Time: 05/20/20  5:15 PM   Result Value Ref Range    Glucose 118 (H) 70 - 99 mg/dL   Glucose by meter    Collection Time: 05/21/20  7:59 AM   Result Value Ref Range    Glucose 160 (H) 70 - 99 mg/dL

## 2020-05-22 VITALS
HEART RATE: 90 BPM | RESPIRATION RATE: 16 BRPM | TEMPERATURE: 98.1 F | OXYGEN SATURATION: 96 % | HEIGHT: 70 IN | DIASTOLIC BLOOD PRESSURE: 77 MMHG | WEIGHT: 175 LBS | BODY MASS INDEX: 25.05 KG/M2 | SYSTOLIC BLOOD PRESSURE: 136 MMHG

## 2020-05-22 LAB — GLUCOSE BLDC GLUCOMTR-MCNC: 139 MG/DL (ref 70–99)

## 2020-05-22 PROCEDURE — 25000132 ZZH RX MED GY IP 250 OP 250 PS 637: Performed by: NURSE PRACTITIONER

## 2020-05-22 PROCEDURE — 99239 HOSP IP/OBS DSCHRG MGMT >30: CPT | Mod: 95 | Performed by: NURSE PRACTITIONER

## 2020-05-22 PROCEDURE — 00000146 ZZHCL STATISTIC GLUCOSE BY METER IP

## 2020-05-22 RX ADMIN — LAMOTRIGINE 25 MG: 25 TABLET ORAL at 08:50

## 2020-05-22 RX ADMIN — CITALOPRAM HYDROBROMIDE 20 MG: 20 TABLET ORAL at 08:50

## 2020-05-22 RX ADMIN — GLIPIZIDE 10 MG: 10 TABLET, FILM COATED, EXTENDED RELEASE ORAL at 08:50

## 2020-05-22 RX ADMIN — BUPROPION HYDROCHLORIDE 300 MG: 300 TABLET, FILM COATED, EXTENDED RELEASE ORAL at 08:50

## 2020-05-22 ASSESSMENT — ACTIVITIES OF DAILY LIVING (ADL)
HYGIENE/GROOMING: INDEPENDENT
ORAL_HYGIENE: INDEPENDENT
LAUNDRY: WITH SUPERVISION
DRESS: STREET CLOTHES

## 2020-05-22 NOTE — PROGRESS NOTES
05/21/20 8186   Significant Event   Significant Event Other (see comments)  (Shift Summary)   Patient had a quiet shift.    Patient did not require seclusion/restraints to manage behavior.    Angel Kent did participate in groups and was visible in the milieu.    Visitors during this shift included none.     Other information about this shift: Pt. had a good shift. He attended and participated in group. He was calm and cooperative. He mentioned that he is ready and anxious about going back home. He denies any SI or SIB.

## 2020-05-22 NOTE — PROGRESS NOTES
Work Completed: CTC reviewed chart, attended team discussion and reviewed discharge plan.  LVM for pt's insurance care specialist to inform of discharge today    Discharge plan or goal: OP services have been scheduled                Barriers to discharge: none

## 2020-05-22 NOTE — PROGRESS NOTES
Pt reviewed support and coping plan with CTC; copy to pt, copy to chart.    PT reviewed his personal plan of care.  The patient completed the reasons for admit and goals for discharge in the personal plan of care.   Reasons for admit:  1)  anxiety  2)  depressoin  3)  Low self-esteem  4)  Fatalistic attitude    Goals for discharge:  1)  More positive outlook  2)  Calmer attitude  3)  A plan to better handle things (coping skills)

## 2020-05-22 NOTE — PROGRESS NOTES
"   05/21/20 2000   Group Therapy Session   Group Attendance attended group session   Total Time (minutes) 50   Group Type psychotherapeutic   Patient Participation/Contribution cooperative with task;discussed personal experience with topic;listened actively;expressed understanding of topic   Group goal: Explore the concept of \"hope.\"    Facundo reports feeling \"pretty good.\" He presents in a pleasant mood, engaged, good eye contact, and congruent affect. He actively participated in group and thoughtfully engaged in the discussion.  "

## 2020-05-22 NOTE — PROGRESS NOTES
Patient was discharged today to home from [facility]. Discharge instructions were reviewed with the patient. He verbalized understanding of them. Patient denied having any suicidal thoughts at time of discharge. Patient was picking up his medications at a [facility] in [place]. All of patient's personal belongings were returned to him at discharge.

## 2020-05-22 NOTE — DISCHARGE SUMMARY
"Psychiatric Discharge Summary    Angel Kent MRN# [id number]   Age: 60 year old YOB: 1960     Date of Admission:  5/17/2020  Date of Discharge:  5/22/2020  Admitting Physician:  Juan Peterson MD  Discharge Physician:  STANISLAV Jeronimo CNP (Contact: [phone])         Event Leading to Hospitalization:      From H&P 5/17/2020:    \"I was on a number of medications for depression, seizure, diabetes and cholesterol and was taking a lot of pills every day and I got sick of it and I quit taking them in mid-January.  The medication I was taking for depression and anxiety, I did okay for a period of time, but when the coronavirus started right away I felt the anxiety of that and everything just kind of seemed to come to a head in a relatively short time.\"      Angel \"Facundo\" Yoli is a 60-year-old male admitted to [address] on 5/17/2020.  He was admitted as a voluntary patient through the ER due to depressive symptoms and suicidal ideation without a specific plan.  He has not been working due to his symptoms and because of COVID-19.  He worries about not being supportive enough for his son who is about to graduate high school and \"has some ground to make up\" before he does, in spite of being very intelligent.  He says his house is \"a disaster which makes me uncomfortable and anxious.\"  Yesterday he tried to turn on the sprinkler system and some of the sprinkler heads blew.  He is also worried about cracks in his garage floor, and there is a joint  between the garage apron and garage floor.  He is worried about water damage.  He stopped taking all medications in January.  His neurologist initiated Wellbutrin 2 weeks prior to admission and reportedly felt comfortable with him taking Wellbutrin in spite of it lowering the seizure threshold; he has a history of 1 seizure 8-10 years ago.      His mood is depressed.  He has passive suicidal ideation " "without intent/plan and contracts for safety on the unit.  He reports low interest and anhedonia.  His appetite has been low.  He has lost about 15 pounds in 2 months.  His sleep is \"terrible.\"  He wakes in the middle of the night and experiences anxiety about projects he would like to complete at home.  He has low energy and low motivation.  His concentration is \"very sporadic.\"  He has feelings of worthlessness, helpless, hopelessness and guilt.  He says he has been isolating at home because he doesn't want people to see him in his current condition, and his social anxiety has increased.  His anxiety is high, particularly with regard to issues with his home and yard, technology and concerns about his memory.  He reports significant ruminative thoughts.  He denies irritability.  He used to have panic attacks but not recently.  He denies symptoms consistent with mainor, psychosis, OCD and PTSD.  He denies homicidal ideation.      See full admission note by Olga Shannon NP 5/17/2020 for details.           Diagnoses:     Major depressive disorder, severe, recurrent, without psychosis  Generalized anxiety disorder  Type 2 diabetes  Hyperlipidemia  History of 1 seizure         Labs:      Ref. Range 5/17/2020 11:02 5/17/2020 12:17 5/17/2020 18:02 5/18/2020 08:05   Sodium Latest Ref Range: 133 - 144 mmol/L    138   Potassium Latest Ref Range: 3.4 - 5.3 mmol/L    4.0   Chloride Latest Ref Range: 94 - 109 mmol/L    106   Carbon Dioxide Latest Ref Range: 20 - 32 mmol/L    27   Urea Nitrogen Latest Ref Range: 7 - 30 mg/dL    14   Creatinine Latest Ref Range: 0.66 - 1.25 mg/dL    0.88   GFR Estimate Latest Ref Range: >60 mL/min/1.73_m2    >90   GFR Estimate If Black Latest Ref Range: >60 mL/min/1.73_m2    >90   Calcium Latest Ref Range: 8.5 - 10.1 mg/dL    9.1   Anion Gap Latest Ref Range: 3 - 14 mmol/L    5   Albumin Latest Ref Range: 3.4 - 5.0 g/dL    3.9   Protein Total Latest Ref Range: 6.8 - 8.8 g/dL    6.8   Bilirubin " Total Latest Ref Range: 0.2 - 1.3 mg/dL    0.7   Alkaline Phosphatase Latest Ref Range: 40 - 150 U/L    70   ALT Latest Ref Range: 0 - 70 U/L    37   AST Latest Ref Range: 0 - 45 U/L    18   Hemoglobin A1C Latest Ref Range: 0 - 5.6 %    7.0 (H)   TSH Latest Ref Range: 0.40 - 4.00 mU/L    0.63   Vitamin D Deficiency screening Latest Ref Range: 20 - 75 ug/L    19 (L)   Glucose Latest Ref Range: 70 - 99 mg/dL   84 145 (H)   WBC Latest Ref Range: 4.0 - 11.0 10e9/L    4.6   Hemoglobin Latest Ref Range: 13.3 - 17.7 g/dL    16.5   Hematocrit Latest Ref Range: 40.0 - 53.0 %    47.1   Platelet Count Latest Ref Range: 150 - 450 10e9/L    229   RBC Count Latest Ref Range: 4.4 - 5.9 10e12/L    5.27   MCV Latest Ref Range: 78 - 100 fl    89   MCH Latest Ref Range: 26.5 - 33.0 pg    31.3   MCHC Latest Ref Range: 31.5 - 36.5 g/dL    35.0   RDW Latest Ref Range: 10.0 - 15.0 %    12.3   Diff Method Unknown    Automated Method   % Neutrophils Latest Units: %    70.6   % Lymphocytes Latest Units: %    20.7   % Monocytes Latest Units: %    8.1   % Eosinophils Latest Units: %    0.0   % Basophils Latest Units: %    0.2   % Immature Granulocytes Latest Units: %    0.4   Nucleated RBCs Latest Ref Range: 0 /100    0   Absolute Neutrophil Latest Ref Range: 1.6 - 8.3 10e9/L    3.2   Absolute Lymphocytes Latest Ref Range: 0.8 - 5.3 10e9/L    1.0   Absolute Monocytes Latest Ref Range: 0.0 - 1.3 10e9/L    0.4   Absolute Eosinophils Latest Ref Range: 0.0 - 0.7 10e9/L    0.0   Absolute Basophils Latest Ref Range: 0.0 - 0.2 10e9/L    0.0   Abs Immature Granulocytes Latest Ref Range: 0 - 0.4 10e9/L    0.0   Absolute Nucleated RBC Unknown    0.0   COVID-19 Virus PCR to [facility] - Source Unknown  Nasopharyngeal     COVID-19 Virus PCR to [facility] - Result Unknown  Test received-See reflex to IDDL test SARS CoV2 (COVID-19) Virus RT-PCR     SARS-CoV-2 Virus Specimen Source Unknown  Nasopharyngeal     SARS-CoV-2 PCR Result Unknown  NEGATIVE     Amphetamine  Qual Urine Latest Ref Range: NEG^Negative  Negative      Cocaine Qual Urine Latest Ref Range: NEG^Negative  Negative      Opiates Qualitative Urine Latest Ref Range: NEG^Negative  Negative      Cannabinoids Qual Urine Latest Ref Range: NEG^Negative  Negative      Barbiturates Qual Urine Latest Ref Range: NEG^Negative  Negative      Benzodiazepine Qual Urine Latest Ref Range: NEG^Negative  Negative      Ethanol Qual Urine Latest Ref Range: NEG^Negative  Negative           Ref. Range 5/18/2020 12:13 5/18/2020 17:55 5/19/2020 17:56 5/20/2020 07:58   Glucose Latest Ref Range: 70 - 99 mg/dL 101 (H) 94 95 145 (H)        Ref. Range 5/20/2020 17:15 5/21/2020 07:59 5/21/2020 18:04   Glucose Latest Ref Range: 70 - 99 mg/dL 118 (H) 160 (H) 122 (H)            Consults:     No consultations were requested during this admission.         Hospital Course:     Angel Kent was admitted to [facility] with attending Juan Peterson MD, under the direct care of Olga Shannon NP as a voluntary patient. The patient was placed under status 15 (15 minute checks) to ensure patient safety.     Wellbutrin  mg daily was continued.  His neurologist, who had prescribed Wellbutrin XL, was contacted.  Discussed his history of 1 seizure about 10 years ago.  A Lamictal titration was initiated; he had previously taken Lamictal for several years.  Celexa 20 mg daily was initiated.  PRN Hydroxyzine was initiated but was not effective and discontinued.  PRN Neurontin was initiated but was not effective and was discontinued.  PRN Trazodone was initiated and was beneficial for insomnia.  He tolerated his medications well, without complaints of side effects.  He did report feeling overwhelmed by the number of medications he was taking.  His wife indicated that she would assist him with medications.      Angel Kent did participate in groups and was visible in the milieu.  Behavior was calm and cooperative.  He appeared motivated  for treatment and recovery.  The treatment team discussed his cognitive distortions, particularly catastrophizing, which appeared to be contributing to his symptoms.  The treatment team discussed the possibility of partial hospitalization, however he stated that he was not comfortable with technology and would have difficulty looking at a screen for so long every day.  His wife concurred.  He was pleased to have the opportunity to see a therapist in person rather than via video following discharge.    The patient's symptoms of depression improved.  He was hopeful and future-oriented.  He denied suicidal ideation.  Sleep was much improved.  Appetite was improved.  Anxiety was moderate and reduced compared to admission.      Angel Kent was released to home.  At the time of discharge Angel Kent was determined to not be a danger to himself or others.          Discharge Medications:      Angel Kent   Home Medication Instructions HELENA:[id number]    Printed on:05/22/20 0459   Medication Information                      atorvastatin (LIPITOR) 20 MG tablet  Take 20 mg by mouth daily             buPROPion (WELLBUTRIN XL) 300 MG 24 hr tablet  Take 1 tablet (300 mg) by mouth every morning             citalopram (CELEXA) 20 MG tablet  Take 1 tablet (20 mg) by mouth daily             glipiZIDE (GLUCOTROL XL) 10 MG 24 hr tablet  Take 10 mg by mouth daily             lamoTRIgine (LAMICTAL) 25 MG tablet  Take 1 tablet (25 mg) by mouth daily x 9 days, then take 2 tablets daily x 14 days, then take 4 tablets daily             metFORMIN (GLUCOPHAGE-XR) 500 MG 24 hr tablet  Take 2 tabs (1000 mg) nightly for 1 week, 3 tabs (1500 mg) nightly for 1 week, then 4 tabs (2000 mg) nightly thereafter.             traZODone (DESYREL) 50 MG tablet  Take 1 tablet (50 mg) by mouth nightly as needed for sleep                      Psychiatric Examination:     Appearance:  awake, alert and adequately  "groomed  Attitude:  cooperative  Eye Contact:  good  Mood:  \"pretty good\" less depressed, less anxious  Affect:  appropriate and in normal range, smiling  Speech:  clear, coherent  Psychomotor Behavior:  no evidence of tardive dyskinesia, dystonia, or tics  Thought Process:  linear and goal oriented  Associations:  no loose associations  Thought Content:  no evidence of psychotic thought, denies homicidal ideation, denies suicidal ideation  Insight:  fair  Judgment:  intact  Oriented to:  date, time, person, and place  Attention Span and Concentration:  some impairment but improved compared to admission  Recent and Remote Memory:  reports impairment in short term and long term memory, but improved compared to admission  Language:  intact  Fund of Knowledge:  appropriate  Muscle Strength and Tone:  normal  Gait and Station:  normal     /75 (BP Location: Left arm)   Pulse 76   Temp 97.7  F (36.5  C) (Tympanic)   Resp 16   Ht 1.778 m (5' 10\")   Wt 79.4 kg (175 lb)   SpO2 100%   BMI 25.11 kg/m           Discharge Plan:     Per Discharge AVS:    Health Care Follow-up Appointments:     [facility]  Intake scheduled on Tuesday May 26, 2020 @ 2:30pm  Provider:  Kathrin Moreno  This is an in person visit,  Not telehealth. Please arrive at  your appointment 30 minutes early and bring a copy of your insurance card and a photo ID.  Address[place] [facility] [place] [address]  Phone: [phone]   Fax: [phone]    PCP:   Aubrey Guo PA-C  Follow up phone appointment scheduled on May 27, 2020 @1pm.  He will call you at [phone]   [facility]  [address]  [phone]      Dr. Pittman/Neurologist  [facility]    Your insurance company assigned a care specialist, Katherine, [phone] who can assist you with any additional appointments you might need.  Please consider contacting her for future needs.      A 30-day supply of " medications plus one refill (with the exception of Lamictal which could not be refilled due to the titration) was e-prescribed to his [facility] pharmacy.  He was advised to take his medications as prescribed and to abstain from alcohol and illicit substances.          Telemedicine Visit: The patient's condition can be safely assessed and treated via synchronous audio and visual telemedicine encounter.       Start Time: 0801  Stop Time: 0812     Reason for Telemedicine Visit: COVID-19 precautions     Originating Site (Patient Location): [facility]     Distant Site (Provider Location): Provider Remote Setting     Consent:  The patient/guardian has verbally consented to: the potential risks and benefits of telemedicine (video visit) versus in person care; bill my insurance or make self-payment for services provided; and responsibility for payment of non-covered services.      Mode of Communication:  Video Conference via Skype     As the provider I attest to compliance with applicable laws and regulations related to telemedicine.       Attestation:  The patient has been seen and evaluated by me, STANISLAV Jeronimo CNP   Discharge time > 30 minutes      Clinical Global Impressions  First:  Considering your total clinical experience with this particular patient population, how severe are the patient's symptoms at this time?: 7 (05/17/20 1304)  Compared to the patient's condition at the START of treatment, this patient's condition is: 4 (05/17/20 1304)  Most recent:  Considering your total clinical experience with this particular patient population, how severe are the patient's symptoms at this time?: 4 (05/22/20 0817)  Compared to the patient's condition at the START of treatment, this patient's condition is: 2 (05/22/20 0817)

## 2020-05-22 NOTE — DISCHARGE INSTRUCTIONS
Behavioral Discharge Planning and Instructions      Summary:  You were admitted on 5/17/2020  due to suicidal ideation, depression, and anxiety.  You were treated by Olga Shannon NP and discharged on 5/22/2020 from [facility] Home      Principal Diagnosis:   Major depressive disorder, severe, recurrent, without psychosis  Generalized anxiety disorder  Type 2 diabetes  Hyperlipidemia  History of 1 seizure      Health Care Follow-up Appointments:     [facility]  Intake scheduled on Tuesday May 26, 2020 @ 2:30pm  Provider:  Kathrin Moreno  This is an in person visit,  Not telehealth. Please arrive at  your appointment 30 minutes early and bring a copy of your insurance card and a photo ID.  Address: [address] [place], [place] [address], [place] [address]  Phone: [phone]   Fax: [phone]    PCP:   Aubrey Guo PA-C  This is your established provider.  Follow up phone appointment scheduled on May 27, 2020 @1pm.  He will call you at [phone]   [facility]  [address] [facility]  [phone]      Dr. Pittman/Neurologist  [facility]    Your insurance company assigned a care specialist, Katherine, [phone]  who can assist you with any additional appointments you might need.  Please consider contacting her for future needs.  Attend all scheduled appointments with your outpatient providers. Call at least 24 hours in advance if you need to reschedule an appointment to ensure continued access to your outpatient providers.   Major Treatments, Procedures and Findings:  You were provided with: a psychiatric assessment, assessed for medical stability, medication evaluation and/or management, group therapy and milieu management    Symptoms to Report: feeling more aggressive, increased confusion, losing more sleep, mood getting worse or thoughts of suicide    Early warning signs can include: increased depression or anxiety sleep disturbances increased thoughts or  "behaviors of suicide or self-harm  increased unusual thinking, such as paranoia or hearing voices    Safety and Wellness:  Take all medicines as directed.  Make no changes unless your doctor suggests them.      Follow treatment recommendations.  Refrain from alcohol and non-prescribed drugs.  If there is a concern for safety, call 491.    Resources:   Crisis Intervention: 735.560.3765 or 647-543-4265 (TTY: 915.856.6393).  Call anytime for help.  National [place] on Mental Illness (www.[place].luc.org): 253.462.5931 or 229-033-7268.  Suicide Awareness Voices of Education (SAVE) (www.save.org): 175-098-CGKJ (9056)  National Suicide Prevention Line (www.mentalhealthmn.org): 582-249-CYPW (7790)  Mental Health Consumer/Survivor Network of [place] (www.mhcsn.net): 168.260.9872 or 688-295-3035  Mental Health Association of [place] (www.mentalhealth.org): 395.698.7080 or 480-457-9048  [facility] Crisis Response 614-908-4529  Text 4 Life: txt \"LIFE\" to 62576 for immediate support and crisis intervention  Crisis text line: Text \"[place]\" to 492453. Free, confidential, 24/7.  Crisis Intervention: 571.338.6380 or 021-640-4768. Call anytime for help.       The treatment team has appreciated the opportunity to work with you.     If you have any questions or concerns our unit number is [phone]        "
